# Patient Record
Sex: MALE | Race: WHITE | NOT HISPANIC OR LATINO | Employment: OTHER | URBAN - METROPOLITAN AREA
[De-identification: names, ages, dates, MRNs, and addresses within clinical notes are randomized per-mention and may not be internally consistent; named-entity substitution may affect disease eponyms.]

---

## 2018-06-11 ENCOUNTER — HOSPITAL ENCOUNTER (INPATIENT)
Facility: HOSPITAL | Age: 83
LOS: 3 days | Discharge: NURSING, REHAB OR LONG-TERM CARE | DRG: 086 | End: 2018-06-15
Attending: SURGERY | Admitting: SURGERY
Payer: MEDICARE

## 2018-06-11 ENCOUNTER — APPOINTMENT (EMERGENCY)
Dept: RADIOLOGY | Facility: HOSPITAL | Age: 83
DRG: 086 | End: 2018-06-11
Payer: MEDICARE

## 2018-06-11 DIAGNOSIS — I60.9 SAH (SUBARACHNOID HEMORRHAGE) (HCC): Primary | ICD-10-CM

## 2018-06-11 DIAGNOSIS — S02.19XA CLOSED FRACTURE OF TEMPORAL BONE, INITIAL ENCOUNTER (HCC): ICD-10-CM

## 2018-06-11 DIAGNOSIS — Z01.89 ENCOUNTER FOR GERIATRIC ASSESSMENT: ICD-10-CM

## 2018-06-11 DIAGNOSIS — S12.401A CLOSED NONDISPLACED FRACTURE OF FIFTH CERVICAL VERTEBRA, UNSPECIFIED FRACTURE MORPHOLOGY, INITIAL ENCOUNTER (HCC): ICD-10-CM

## 2018-06-11 DIAGNOSIS — I62.00 SUBDURAL HEMORRHAGE (HCC): ICD-10-CM

## 2018-06-11 DIAGNOSIS — S06.5X9A SDH (SUBDURAL HEMATOMA) (HCC): ICD-10-CM

## 2018-06-11 LAB
ANION GAP BLD CALC-SCNC: 17 MMOL/L (ref 4–13)
ANION GAP SERPL CALCULATED.3IONS-SCNC: 9 MMOL/L (ref 4–13)
APTT PPP: 27 SECONDS (ref 24–36)
BASOPHILS # BLD AUTO: 0.04 THOUSANDS/ΜL (ref 0–0.1)
BASOPHILS NFR BLD AUTO: 0 % (ref 0–1)
BUN BLD-MCNC: 23 MG/DL (ref 5–25)
BUN SERPL-MCNC: 23 MG/DL (ref 5–25)
CA-I BLD-SCNC: 1.17 MMOL/L (ref 1.12–1.32)
CALCIUM SERPL-MCNC: 9 MG/DL (ref 8.3–10.1)
CHLORIDE BLD-SCNC: 103 MMOL/L (ref 100–108)
CHLORIDE SERPL-SCNC: 105 MMOL/L (ref 100–108)
CO2 SERPL-SCNC: 28 MMOL/L (ref 21–32)
CREAT BLD-MCNC: 1.2 MG/DL (ref 0.6–1.3)
CREAT SERPL-MCNC: 1.36 MG/DL (ref 0.6–1.3)
EOSINOPHIL # BLD AUTO: 0.05 THOUSAND/ΜL (ref 0–0.61)
EOSINOPHIL NFR BLD AUTO: 0 % (ref 0–6)
ERYTHROCYTE [DISTWIDTH] IN BLOOD BY AUTOMATED COUNT: 13.9 % (ref 11.6–15.1)
GFR SERPL CREATININE-BSD FRML MDRD: 46 ML/MIN/1.73SQ M
GFR SERPL CREATININE-BSD FRML MDRD: 54 ML/MIN/1.73SQ M
GLUCOSE SERPL-MCNC: 136 MG/DL (ref 65–140)
GLUCOSE SERPL-MCNC: 143 MG/DL (ref 65–140)
HCT VFR BLD AUTO: 38.7 % (ref 36.5–49.3)
HCT VFR BLD CALC: 37 % (ref 36.5–49.3)
HGB BLD-MCNC: 12.8 G/DL (ref 12–17)
HGB BLDA-MCNC: 12.6 G/DL (ref 12–17)
IMM GRANULOCYTES # BLD AUTO: 0.05 THOUSAND/UL (ref 0–0.2)
IMM GRANULOCYTES NFR BLD AUTO: 0 % (ref 0–2)
INR PPP: 1.06 (ref 0.86–1.17)
LYMPHOCYTES # BLD AUTO: 1.51 THOUSANDS/ΜL (ref 0.6–4.47)
LYMPHOCYTES NFR BLD AUTO: 13 % (ref 14–44)
MCH RBC QN AUTO: 31.2 PG (ref 26.8–34.3)
MCHC RBC AUTO-ENTMCNC: 33.1 G/DL (ref 31.4–37.4)
MCV RBC AUTO: 94 FL (ref 82–98)
MONOCYTES # BLD AUTO: 0.56 THOUSAND/ΜL (ref 0.17–1.22)
MONOCYTES NFR BLD AUTO: 5 % (ref 4–12)
NEUTROPHILS # BLD AUTO: 9.5 THOUSANDS/ΜL (ref 1.85–7.62)
NEUTS SEG NFR BLD AUTO: 82 % (ref 43–75)
NRBC BLD AUTO-RTO: 0 /100 WBCS
PA ADP BLD-ACNC: 437 ARU
PCO2 BLD: 27 MMOL/L (ref 21–32)
PLATELET # BLD AUTO: 169 THOUSANDS/UL (ref 149–390)
PMV BLD AUTO: 10.1 FL (ref 8.9–12.7)
POTASSIUM BLD-SCNC: 3.8 MMOL/L (ref 3.5–5.3)
POTASSIUM SERPL-SCNC: 3.7 MMOL/L (ref 3.5–5.3)
PROTHROMBIN TIME: 13.9 SECONDS (ref 11.8–14.2)
RBC # BLD AUTO: 4.1 MILLION/UL (ref 3.88–5.62)
SODIUM BLD-SCNC: 142 MMOL/L (ref 136–145)
SODIUM SERPL-SCNC: 142 MMOL/L (ref 136–145)
SPECIMEN SOURCE: ABNORMAL
WBC # BLD AUTO: 11.71 THOUSAND/UL (ref 4.31–10.16)

## 2018-06-11 PROCEDURE — 86901 BLOOD TYPING SEROLOGIC RH(D): CPT | Performed by: SURGERY

## 2018-06-11 PROCEDURE — 70450 CT HEAD/BRAIN W/O DYE: CPT

## 2018-06-11 PROCEDURE — 74177 CT ABD & PELVIS W/CONTRAST: CPT

## 2018-06-11 PROCEDURE — 85730 THROMBOPLASTIN TIME PARTIAL: CPT | Performed by: SURGERY

## 2018-06-11 PROCEDURE — 90715 TDAP VACCINE 7 YRS/> IM: CPT | Performed by: STUDENT IN AN ORGANIZED HEALTH CARE EDUCATION/TRAINING PROGRAM

## 2018-06-11 PROCEDURE — 72125 CT NECK SPINE W/O DYE: CPT

## 2018-06-11 PROCEDURE — 96365 THER/PROPH/DIAG IV INF INIT: CPT

## 2018-06-11 PROCEDURE — 85025 COMPLETE CBC W/AUTO DIFF WBC: CPT | Performed by: SURGERY

## 2018-06-11 PROCEDURE — 36415 COLL VENOUS BLD VENIPUNCTURE: CPT | Performed by: STUDENT IN AN ORGANIZED HEALTH CARE EDUCATION/TRAINING PROGRAM

## 2018-06-11 PROCEDURE — 80047 BASIC METABLC PNL IONIZED CA: CPT

## 2018-06-11 PROCEDURE — 85014 HEMATOCRIT: CPT

## 2018-06-11 PROCEDURE — 86900 BLOOD TYPING SEROLOGIC ABO: CPT | Performed by: SURGERY

## 2018-06-11 PROCEDURE — 85610 PROTHROMBIN TIME: CPT | Performed by: SURGERY

## 2018-06-11 PROCEDURE — 99220 PR INITIAL OBSERVATION CARE/DAY 70 MINUTES: CPT | Performed by: SURGERY

## 2018-06-11 PROCEDURE — 85576 BLOOD PLATELET AGGREGATION: CPT | Performed by: SURGERY

## 2018-06-11 PROCEDURE — 86850 RBC ANTIBODY SCREEN: CPT | Performed by: SURGERY

## 2018-06-11 PROCEDURE — 71260 CT THORAX DX C+: CPT

## 2018-06-11 PROCEDURE — 80048 BASIC METABOLIC PNL TOTAL CA: CPT | Performed by: SURGERY

## 2018-06-11 RX ORDER — DIPHENOXYLATE HYDROCHLORIDE AND ATROPINE SULFATE 2.5; .025 MG/1; MG/1
1 TABLET ORAL DAILY
COMMUNITY

## 2018-06-11 RX ORDER — LISINOPRIL 40 MG/1
TABLET ORAL
COMMUNITY
Start: 2012-09-20

## 2018-06-11 RX ORDER — METOPROLOL SUCCINATE 100 MG/1
TABLET, EXTENDED RELEASE ORAL
COMMUNITY
Start: 2012-09-20

## 2018-06-11 RX ADMIN — DESMOPRESSIN ACETATE 28.4 MCG: 4 SOLUTION INTRAVENOUS at 23:15

## 2018-06-11 RX ADMIN — IOHEXOL 100 ML: 350 INJECTION, SOLUTION INTRAVENOUS at 23:00

## 2018-06-11 RX ADMIN — TETANUS TOXOID, REDUCED DIPHTHERIA TOXOID AND ACELLULAR PERTUSSIS VACCINE, ADSORBED 0.5 ML: 5; 2.5; 8; 8; 2.5 SUSPENSION INTRAMUSCULAR at 23:41

## 2018-06-12 ENCOUNTER — APPOINTMENT (INPATIENT)
Dept: RADIOLOGY | Facility: HOSPITAL | Age: 83
DRG: 086 | End: 2018-06-12
Payer: MEDICARE

## 2018-06-12 PROBLEM — R53.81 PHYSICAL DECONDITIONING: Status: ACTIVE | Noted: 2018-06-12

## 2018-06-12 PROBLEM — S02.19XA CLOSED FRACTURE OF TEMPORAL BONE (HCC): Status: ACTIVE | Noted: 2018-06-12
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
## 2018-06-12 PROBLEM — I62.00 SUBDURAL HEMORRHAGE (HCC): Status: ACTIVE | Noted: 2018-06-12

## 2018-06-12 PROBLEM — I60.9 SAH (SUBARACHNOID HEMORRHAGE) (HCC): Status: ACTIVE | Noted: 2018-06-12

## 2018-06-12 PROBLEM — H54.7 VISUAL IMPAIRMENT: Status: ACTIVE | Noted: 2018-06-12

## 2018-06-12 PROBLEM — S12.401A CLOSED NONDISPLACED FRACTURE OF FIFTH CERVICAL VERTEBRA (HCC): Status: ACTIVE | Noted: 2018-06-12

## 2018-06-12 PROBLEM — H91.90 HOH (HARD OF HEARING): Status: ACTIVE | Noted: 2018-06-12

## 2018-06-12 LAB
ABO GROUP BLD: NORMAL
ANION GAP SERPL CALCULATED.3IONS-SCNC: 9 MMOL/L (ref 4–13)
BASOPHILS # BLD AUTO: 0.01 THOUSANDS/ΜL (ref 0–0.1)
BASOPHILS NFR BLD AUTO: 0 % (ref 0–1)
BLD GP AB SCN SERPL QL: NEGATIVE
BUN SERPL-MCNC: 20 MG/DL (ref 5–25)
CALCIUM SERPL-MCNC: 7.8 MG/DL (ref 8.3–10.1)
CHLORIDE SERPL-SCNC: 106 MMOL/L (ref 100–108)
CO2 SERPL-SCNC: 27 MMOL/L (ref 21–32)
CREAT SERPL-MCNC: 1.19 MG/DL (ref 0.6–1.3)
EOSINOPHIL # BLD AUTO: 0 THOUSAND/ΜL (ref 0–0.61)
EOSINOPHIL NFR BLD AUTO: 0 % (ref 0–6)
ERYTHROCYTE [DISTWIDTH] IN BLOOD BY AUTOMATED COUNT: 14 % (ref 11.6–15.1)
GFR SERPL CREATININE-BSD FRML MDRD: 55 ML/MIN/1.73SQ M
GLUCOSE SERPL-MCNC: 147 MG/DL (ref 65–140)
HCT VFR BLD AUTO: 33.5 % (ref 36.5–49.3)
HGB BLD-MCNC: 10.8 G/DL (ref 12–17)
IMM GRANULOCYTES # BLD AUTO: 0.02 THOUSAND/UL (ref 0–0.2)
IMM GRANULOCYTES NFR BLD AUTO: 0 % (ref 0–2)
LYMPHOCYTES # BLD AUTO: 1.14 THOUSANDS/ΜL (ref 0.6–4.47)
LYMPHOCYTES NFR BLD AUTO: 13 % (ref 14–44)
MAGNESIUM SERPL-MCNC: 2.7 MG/DL (ref 1.6–2.6)
MCH RBC QN AUTO: 30.3 PG (ref 26.8–34.3)
MCHC RBC AUTO-ENTMCNC: 32.2 G/DL (ref 31.4–37.4)
MCV RBC AUTO: 94 FL (ref 82–98)
MONOCYTES # BLD AUTO: 0.36 THOUSAND/ΜL (ref 0.17–1.22)
MONOCYTES NFR BLD AUTO: 4 % (ref 4–12)
NEUTROPHILS # BLD AUTO: 7.14 THOUSANDS/ΜL (ref 1.85–7.62)
NEUTS SEG NFR BLD AUTO: 83 % (ref 43–75)
NRBC BLD AUTO-RTO: 0 /100 WBCS
PHOSPHATE SERPL-MCNC: 3.2 MG/DL (ref 2.3–4.1)
PLATELET # BLD AUTO: 145 THOUSANDS/UL (ref 149–390)
PMV BLD AUTO: 10.2 FL (ref 8.9–12.7)
POTASSIUM SERPL-SCNC: 4.2 MMOL/L (ref 3.5–5.3)
RBC # BLD AUTO: 3.56 MILLION/UL (ref 3.88–5.62)
RH BLD: POSITIVE
SODIUM SERPL-SCNC: 142 MMOL/L (ref 136–145)
SPECIMEN EXPIRATION DATE: NORMAL
WBC # BLD AUTO: 8.67 THOUSAND/UL (ref 4.31–10.16)

## 2018-06-12 PROCEDURE — 80048 BASIC METABOLIC PNL TOTAL CA: CPT | Performed by: STUDENT IN AN ORGANIZED HEALTH CARE EDUCATION/TRAINING PROGRAM

## 2018-06-12 PROCEDURE — 85025 COMPLETE CBC W/AUTO DIFF WBC: CPT | Performed by: STUDENT IN AN ORGANIZED HEALTH CARE EDUCATION/TRAINING PROGRAM

## 2018-06-12 PROCEDURE — 90471 IMMUNIZATION ADMIN: CPT

## 2018-06-12 PROCEDURE — 99223 1ST HOSP IP/OBS HIGH 75: CPT | Performed by: PHYSICIAN ASSISTANT

## 2018-06-12 PROCEDURE — 83735 ASSAY OF MAGNESIUM: CPT | Performed by: EMERGENCY MEDICINE

## 2018-06-12 PROCEDURE — 70480 CT ORBIT/EAR/FOSSA W/O DYE: CPT

## 2018-06-12 PROCEDURE — 99285 EMERGENCY DEPT VISIT HI MDM: CPT

## 2018-06-12 PROCEDURE — 99232 SBSQ HOSP IP/OBS MODERATE 35: CPT | Performed by: SURGERY

## 2018-06-12 PROCEDURE — 72040 X-RAY EXAM NECK SPINE 2-3 VW: CPT

## 2018-06-12 PROCEDURE — 84100 ASSAY OF PHOSPHORUS: CPT | Performed by: EMERGENCY MEDICINE

## 2018-06-12 PROCEDURE — 70450 CT HEAD/BRAIN W/O DYE: CPT

## 2018-06-12 PROCEDURE — 99232 SBSQ HOSP IP/OBS MODERATE 35: CPT | Performed by: PHYSICIAN ASSISTANT

## 2018-06-12 RX ORDER — ACETAMINOPHEN 325 MG/1
650 TABLET ORAL EVERY 6 HOURS PRN
Status: DISCONTINUED | OUTPATIENT
Start: 2018-06-12 | End: 2018-06-12

## 2018-06-12 RX ORDER — LIDOCAINE 50 MG/G
1 PATCH TOPICAL DAILY
Status: DISCONTINUED | OUTPATIENT
Start: 2018-06-12 | End: 2018-06-12

## 2018-06-12 RX ORDER — LIDOCAINE 50 MG/G
1 PATCH TOPICAL DAILY PRN
Status: DISCONTINUED | OUTPATIENT
Start: 2018-06-12 | End: 2018-06-12

## 2018-06-12 RX ORDER — LEVETIRACETAM 500 MG/1
500 TABLET ORAL EVERY 12 HOURS SCHEDULED
Status: DISCONTINUED | OUTPATIENT
Start: 2018-06-12 | End: 2018-06-15 | Stop reason: HOSPADM

## 2018-06-12 RX ORDER — SODIUM CHLORIDE, SODIUM LACTATE, POTASSIUM CHLORIDE, CALCIUM CHLORIDE 600; 310; 30; 20 MG/100ML; MG/100ML; MG/100ML; MG/100ML
125 INJECTION, SOLUTION INTRAVENOUS CONTINUOUS
Status: DISCONTINUED | OUTPATIENT
Start: 2018-06-12 | End: 2018-06-12

## 2018-06-12 RX ORDER — CHLORHEXIDINE GLUCONATE 0.12 MG/ML
15 RINSE ORAL EVERY 12 HOURS SCHEDULED
Status: DISCONTINUED | OUTPATIENT
Start: 2018-06-12 | End: 2018-06-15 | Stop reason: HOSPADM

## 2018-06-12 RX ORDER — OXYCODONE HYDROCHLORIDE 5 MG/1
5 TABLET ORAL EVERY 4 HOURS PRN
Status: DISCONTINUED | OUTPATIENT
Start: 2018-06-12 | End: 2018-06-15 | Stop reason: HOSPADM

## 2018-06-12 RX ORDER — AMOXICILLIN 250 MG
1 CAPSULE ORAL
Status: DISCONTINUED | OUTPATIENT
Start: 2018-06-12 | End: 2018-06-15 | Stop reason: HOSPADM

## 2018-06-12 RX ORDER — ACETAMINOPHEN 325 MG/1
650 TABLET ORAL EVERY 8 HOURS PRN
Status: DISCONTINUED | OUTPATIENT
Start: 2018-06-12 | End: 2018-06-15 | Stop reason: HOSPADM

## 2018-06-12 RX ORDER — ACETAMINOPHEN 325 MG/1
650 TABLET ORAL EVERY 8 HOURS SCHEDULED
Status: DISCONTINUED | OUTPATIENT
Start: 2018-06-12 | End: 2018-06-12

## 2018-06-12 RX ORDER — OXYCODONE HYDROCHLORIDE 5 MG/1
2.5 TABLET ORAL EVERY 4 HOURS PRN
Status: DISCONTINUED | OUTPATIENT
Start: 2018-06-12 | End: 2018-06-15 | Stop reason: HOSPADM

## 2018-06-12 RX ORDER — SODIUM CHLORIDE, SODIUM GLUCONATE, SODIUM ACETATE, POTASSIUM CHLORIDE, MAGNESIUM CHLORIDE, SODIUM PHOSPHATE, DIBASIC, AND POTASSIUM PHOSPHATE .53; .5; .37; .037; .03; .012; .00082 G/100ML; G/100ML; G/100ML; G/100ML; G/100ML; G/100ML; G/100ML
75 INJECTION, SOLUTION INTRAVENOUS CONTINUOUS
Status: DISCONTINUED | OUTPATIENT
Start: 2018-06-12 | End: 2018-06-12

## 2018-06-12 RX ADMIN — LEVETIRACETAM 500 MG: 500 TABLET ORAL at 22:21

## 2018-06-12 RX ADMIN — CHLORHEXIDINE GLUCONATE 15 ML: 1.2 RINSE ORAL at 22:21

## 2018-06-12 RX ADMIN — Medication 1 TABLET: at 22:21

## 2018-06-12 RX ADMIN — LEVETIRACETAM 500 MG: 100 INJECTION, SOLUTION INTRAVENOUS at 01:30

## 2018-06-12 RX ADMIN — SODIUM CHLORIDE, SODIUM GLUCONATE, SODIUM ACETATE, POTASSIUM CHLORIDE, MAGNESIUM CHLORIDE, SODIUM PHOSPHATE, DIBASIC, AND POTASSIUM PHOSPHATE 75 ML/HR: .53; .5; .37; .037; .03; .012; .00082 INJECTION, SOLUTION INTRAVENOUS at 01:13

## 2018-06-12 RX ADMIN — LEVETIRACETAM 500 MG: 500 TABLET ORAL at 09:57

## 2018-06-12 RX ADMIN — CHLORHEXIDINE GLUCONATE 15 ML: 1.2 RINSE ORAL at 09:57

## 2018-06-12 NOTE — SOCIAL WORK
CM met with pt and his son Viridiana Abarca [phone] at bedside  CM reviewed role with dcp  Pt resides at [facility] in [facility]  Pt reports being on 2nd floor with elevator access  Pt reports independent with ADLs and ambulation PTA  Pt reports he drives limited distance and his son transports him for longer trips  Pt reports no hx of VNA or STR  Pt reports hx of anxiety after his wife passed away in 2013  At that time pt was seeing a psychiatrist  No current MH tx  Pt reports no hx of drug/alcohol abuse  Pharmacy is [email or web address] for long term and [facility] for short term medication  PCP is Dr Mason Méndez  Pt reports his son - Jerzy Leavitt is his POA and has a LW  CM requested Jerzy Leavitt bring a copy  CM to follow for dcp  CM reviewed d/c planning process including the following: identifying help at home, patient preference for d/c planning needs, Discharge Lounge, [facility] Meds to Bed program, availability of treatment team to discuss questions or concerns patient and/or family may have regarding understanding medications and recognizing signs and symptoms once discharged  CM also encouraged patient to follow up with all recommended appointments after discharge  Patient advised of importance for patient and family to participate in managing patients medical well being

## 2018-06-12 NOTE — PLAN OF CARE
DISCHARGE PLANNING     Discharge to home or other facility with appropriate resources Progressing        HEMATOLOGIC - ADULT     Maintains hematologic stability Progressing        INFECTION - ADULT     Absence or prevention of progression during hospitalization Progressing     Absence of fever/infection during neutropenic period Progressing        Knowledge Deficit     Patient/family/caregiver demonstrates understanding of disease process, treatment plan, medications, and discharge instructions Progressing        METABOLIC, FLUID AND ELECTROLYTES - ADULT     Electrolytes maintained within normal limits Progressing     Fluid balance maintained Progressing     Glucose maintained within target range Progressing        NEUROSENSORY - ADULT     Achieves stable or improved neurological status Progressing     Absence of seizures Progressing     Remains free of injury related to seizures activity Progressing     Achieves maximal functionality and self care Progressing        PAIN - ADULT     Verbalizes/displays adequate comfort level or baseline comfort level Progressing        Potential for Falls     Patient will remain free of falls Progressing        SAFETY ADULT     Maintain or return to baseline ADL function Progressing     Maintain or return mobility status to optimal level Progressing        SKIN/TISSUE INTEGRITY - ADULT     Skin integrity remains intact Progressing     Incision(s), wounds(s) or drain site(s) healing without S/S of infection Progressing     Oral mucous membranes remain intact Progressing

## 2018-06-12 NOTE — CASE MANAGEMENT
Initial Clinical Review    Admission: Date/Time/Statement: 6/12/18 @ 0020     Orders Placed This Encounter   Procedures    Inpatient Admission     Standing Status:   Standing     Number of Occurrences:   1     Order Specific Question:   Admitting Physician     Answer:   Marleen Lockwood     Order Specific Question:   Level of Care     Answer:   Critical Care [15]     Order Specific Question:   Estimated length of stay     Answer:   More than 2 Midnights     Order Specific Question:   Certification     Answer:   I certify that inpatient services are medically necessary for this patient for a duration of greater than two midnights  See H&P and MD Progress Notes for additional information about the patient's course of treatment  ED: Date/Time/Mode of Arrival:   ED Arrival Information     Expected Arrival Acuity Means of Arrival Escorted By Service Admission Type    6/11/2018 21:49 6/11/2018 22:27 [facility] [place] [facility]    Arrival Complaint    fall, SAH, SDH, R temporal bone fx          Chief Complaint:   Chief Complaint   Patient presents with    Fall     Pt slipped and fell down the steps of a bus  Pt fell backwards and hit his head  No LOC  Pt on ASA  Pt sent from [facility] with a subdural, subarachnoid and right temporal fracture       History of Illness:      Tejas Cordon is a 80 y o  male who presents as a trauma transfer from [facility] following a fall earlier this evening  Patient states he was getting off the bus to go to an [employer] baseball game when he misstepped, and fell down multiple steps coming off the bus  Patient states he struck his head directly on the concrete  He did not lose consciousness and remembers the entire event  He does take aspirin 81 daily    He states he mainly has a headache, but also has some neck pain  CT scan at outside hospital demonstrated subarachnoid and subdural hemorrhages, right temporal bone fracture, and C5 fracture  ED Vital Signs:   ED Triage Vitals   Temperature Pulse Respirations Blood Pressure SpO2   06/11/18 2232 06/11/18 2232 06/11/18 2232 06/11/18 2232 06/11/18 2232   97 5 °F (36 4 °C) 63 18 169/79 91 %         Pain Score       06/12/18 0030       No Pain        Wt Readings from Last 1 Encounters:   06/12/18 92 7 kg (204 lb 5 9 oz)       Vital Signs (abnormal): Abnormal Labs/Diagnostic Test Results  Currently patient is GCS 15 with no focal neurological deficits, but has delayed response/comprehension time  He does admit to some numbness/tingling in bilateral hands, which has been present the last few months and is unchanged  CT scan at outside hospital demonstrated subarachnoid and subdural hemorrhages, right temporal bone fracture, and C5 fracture  Positive for nausea (Intermittent nausea)  Positive for neck pain  Positive for numbness (Numbness/tingling in bilateral hands, present for multiple months) and headaches  CREATININE 1 36 (H)     WBC 11 71 (H)         ED Treatment:   Medication Administration from 06/11/2018 2149 to 06/12/2018 0020       Date/Time Order Dose Route     06/11/2018 2315 desmopressin (DDAVP) 28 4 mcg in sodium chloride 0 9 % 50 mL IVPB 28 4 mcg Intravenous     06/11/2018 2341 tetanus-diphtheria-acellular pertussis (BOOSTRIX) IM injection 0 5 mL 0 5 mL Intramuscular       Past Medical/Surgical History:       No Additional Past Medical History       Admitting Diagnosis: SAH (subarachnoid hemorrhage) ([facility] ) [I60 9]  SDH (subdural hematoma) ([facility] ) [I62 00]  Closed fracture of temporal bone, initial encounter ([facility] ) [S02 19XA]  Closed nondisplaced fracture of fifth cervical vertebra, unspecified fracture morphology, initial encounter ([facility] ) [S12 401A]  Unspecified multiple injuries, initial encounter [T07  XXXA]    Age/Sex: 80 y o  male    Assessment/Plan:    Trauma Active Problems:   1) multiple foci SAH  2) small R holohemispheric SDH  3) C5 superior facet fracture  4) right temporal bone fracture     Trauma Plan:   - admit to ICU  - neurosx c/s  - continue C-collar  - keppra seizure ppx  - reverse ASA with DDAVP  - frequent neurochecks       Admission Orders:    NEURO CHECKS  Q1 HR  CERVICAL BRACE   NPO   CONSULT [address]  [facility] Closed fracture of temporal bone, initial encounter         Scheduled Meds:   Current Facility-Administered Medications:  acetaminophen 650 mg Oral Q6H PRN   chlorhexidine 15 mL Swish & Spit Q12H [facility]   levETIRAcetam 500 mg Oral Q12H [facility]   multi-electrolyte 75 mL/hr Intravenous Continuous   oxyCODONE 2 5 mg Oral Q4H PRN   oxyCODONE 5 mg Oral Q4H PRN     Continuous Infusions:   multi-electrolyte 75 mL/hr     PRN Meds:   acetaminophen    oxyCODONE    oxyCODONE

## 2018-06-12 NOTE — CONSULTS
Consultation - Neurosurgery   Kalee Beltran 80 y o  male MRN: [id number]  Unit/Bed#: ICU 01 Encounter: [id number]  Consult completed on 6/12/18 at 11:30      Inpatient consult to Neurosurgery  Consult performed by: Ilda Shah ordered by: Wily Dangelo          Assessment/Plan     Assessment:  1  Right sided holohemispheric subdural hematoma  2  Scattered subarchnoid hemorrhage in right frontal and temporal lobe, within right sylvian fisure  3  C5 right superior facet fracture  4  Closed nondisplaced right temporal bone fracture  5  Hard of hearing  6  Mechanical fall no LOC    Plan:  · Exam: GCS 15  AAOx3 MAHMOOD wo focal weakness, LT and PP intact  No midline tenderness  No PD farias or clonus  finger to nose intact  Briskly able to perform serial 7's  Imaging: personally reviewed and reviewed with attending:  · 6/11/18 - CT head wo: thin righ holohemispheric subdural hematoma, scattered subarachnoid  · 6/11/18 - CT c-spine wo: age indeterminate rigth C5 superior facet fracture   · 6/12/18 - CT head wo: pending official radiology read  Appears stable SDH/SAH  · 6/12/18 - cervical XR: pending official radiology read  Loss of lordotic curve, stable alignment compared to CT scan  Slight decrease height anterior C5 vertebral body   ICH management:  · SBP <160  · Continue Keppra 500mg BID for seizure ppx  · STAT CT head without contrast if decline in GCS >2pts/ 1hr  · DVT ppx: SCDs, hold AC/AP at this time  · History of ASA use, given DDAVP  · Continue to hold ASA for at least 2 weeks   Cervical fracture management:  · Vista collar at all times, jackie collar for showering  · PT/OT: pending evaluation  Medical management:   · Geriatric consult - appreciate recommendations  · Pain control  · Hgb 10 8, trend  Transfuse if <8    No neurosurgical intervention, signing off  Follow up in 2 weeks with repeat XR and CT scan  Call with questions or concerns         History of Present Illness   HPI: Michele Landry Lina Mason is a 80y o  year old male with PMH including CAD s/p quadruple bypass, stent placement 1-2 years ago, on ASA 81mg macular degeneration in right eye, hyperlipidemia who presented to [facility] after a mechanical fall yesterday  He was getting off the bus when he missed the step, falling down the steps and landing on the concrete  He denies LOC  He states he was assisted into a chair then EMS was called  A CT scan of his head demonstrated multiple contusions vs subarachnoid hemorrhage and right sided SDH  His CT scan of his c-spine demonstrated age indeterminate right C5 superior facet fracture  He was transferred to [facility] for further evaluation  Patient states he lives in an independent living facility, he still drives short distances to get a hair cut or grocery shop otherwise he relies on his son or th bus  He denies multiple falls stating his last one was a year ago, another mechanical fall  He denies prior head or neck trauma  He admits to having some dizziness being transferred to CT scan with associated nausea but since has resolved  Review of Systems   Constitutional: Positive for fatigue  Negative for activity change  HENT: Negative for tinnitus and trouble swallowing  Eyes: Negative for photophobia and visual disturbance  Respiratory: Negative for shortness of breath  Cardiovascular: Negative for chest pain  Gastrointestinal: Positive for nausea  Negative for abdominal pain, constipation, diarrhea and vomiting  Genitourinary: Negative for dysuria, frequency and urgency  Musculoskeletal: Negative for back pain and neck pain  Skin: Positive for wound  Negative for rash  Allergic/Immunologic: Negative for environmental allergies and food allergies  Neurological: Positive for dizziness  Negative for syncope, weakness, numbness and headaches  Hematological: Does not bruise/bleed easily  Psychiatric/Behavioral: Negative for confusion   The patient is nervous/anxious (history of anxiety, well controlled on medications)  Historical Information   Past Medical History:   Diagnosis Date    Anxiety     CAD (coronary artery disease)     Cataract     left s/p removal    Hyperlipidemia     Macular degeneration     right     Past Surgical History:   Procedure Laterality Date    CATARACT EXTRACTION Left     CORONARY ANGIOPLASTY WITH STENT PLACEMENT      CORONARY ARTERY BYPASS GRAFT      TONSILECTOMY AND ADNOIDECTOMY       History   Alcohol Use    Yes     Comment: rare      History   Drug Use No     History   Smoking Status    Never Smoker   Smokeless Tobacco    Never Used     Family History   Problem Relation Age of Onset    Breast cancer Mother     Brain cancer Father        Meds/Allergies   all current active meds have been reviewed and current meds:   Current Facility-Administered Medications   Medication Dose Route Frequency    acetaminophen (TYLENOL) tablet 650 mg  650 mg Oral Q8H PRN    chlorhexidine (PERIDEX) 0 12 % oral rinse 15 mL  15 mL Swish & Spit Q12H [address]    levETIRAcetam (KEPPRA) tablet 500 mg  500 mg Oral Q12H [address]    lidocaine (LIDODERM) 5 % patch 1 patch  1 patch Transdermal Daily PRN    oxyCODONE (ROXICODONE) IR tablet 2 5 mg  2 5 mg Oral Q4H PRN    oxyCODONE (ROXICODONE) IR tablet 5 mg  5 mg Oral Q4H PRN    senna-docusate sodium (SENOKOT S) 8 6-50 mg per tablet 1 tablet  1 tablet Oral HS     Allergies   Allergen Reactions    Ezetimibe Rash       Objective   I/O       06/10 0701 - 06/11 0700 06/11 0701 - 06/12 0700 06/12 0701 - 06/13 0700    I V  (mL/kg)  248 8 (2 7) 300 (3 2)    IV Piggyback  150     Total Intake(mL/kg)  398 8 (4 3) 300 (3 2)    Urine (mL/kg/hr)  500 250 (0 5)    Stool  0     Total Output   500 250    Net   -101 3 +50           Unmeasured Stool Occurrence  1 x           Physical Exam   Constitutional: He is oriented to person, place, and time  He appears well-developed and well-nourished     HENT:   Head: Normocephalic and atraumatic  Superficial left occipital abrasion    Eyes: EOM are normal  Pupils are unequal    Cardiovascular: Normal rate  Pulmonary/Chest: Effort normal  No respiratory distress  Abdominal: Soft  There is no tenderness  There is no rebound  Musculoskeletal:        Cervical back: He exhibits no bony tenderness  Thoracic back: He exhibits no bony tenderness  Lumbar back: He exhibits no bony tenderness  Neurological: He is alert and oriented to person, place, and time  He has normal strength  He has a normal Finger-Nose-Finger Test    Reflex Scores:       Bicep reflexes are 2+ on the right side and 2+ on the left side  Brachioradialis reflexes are 2+ on the right side and 2+ on the left side  Patellar reflexes are 2+ on the right side and 2+ on the left side  Achilles reflexes are 2+ on the right side and 2+ on the left side  Skin: Skin is warm  Psychiatric: His speech is normal and behavior is normal  Thought content normal      Neurologic Exam     Mental Status   Oriented to person, place, and time  Registration: recalls 3 of 3 objects  Recall at 5 minutes: recalls 3 of 3 objects  Follows 2 step commands  Attention: normal  Concentration: normal    Speech: speech is normal   Level of consciousness: alert  Knowledge: good  Able to perform simple calculations  Able to name object  Able to repeat  Normal comprehension  Cranial Nerves     CN II   Visual fields full to confrontation  CN III, IV, VI   Extraocular motions are normal    Pupils: unequal  Right pupil: Right pupil size in mm: 3 5mm  Shape: regular  Reactivity: brisk  Left pupil: Size: 3 mm  Shape: regular  Reactivity: brisk  CN III: no CN III palsy  CN VI: no CN VI palsy  Nystagmus: none   Diplopia: none  Ophthalmoparesis: none  Upgaze: normal  Downgaze: normal  Conjugate gaze: present    CN V   Facial sensation intact  CN VII   Facial expression full, symmetric       CN VIII   Hearing: impaired    CN XII   Tongue deviation: none    Motor Exam   Muscle bulk: normal  Overall muscle tone: normal  Right arm pronator drift: absent  Left arm pronator drift: absent    Strength   Strength 5/5 throughout  Sensory Exam   Light touch normal    Proprioception normal    Pinprick normal      Gait, Coordination, and Reflexes     Coordination   Finger to nose coordination: normal    Tremor   Resting tremor: absent    Reflexes   Right brachioradialis: 2+  Left brachioradialis: 2+  Right biceps: 2+  Left biceps: 2+  Right patellar: 2+  Left patellar: 2+  Right achilles: 2+  Left achilles: 2+  Right Liu: absent  Left Liu: absent  Right ankle clonus: absent  Left ankle clonus: absent      Vitals:Blood pressure (!) 117/49, pulse 62, temperature 99 3 °F (37 4 °C), temperature source Oral, resp  rate 16, height 5' 10" (1 778 m), weight 92 7 kg (204 lb 5 9 oz), SpO2 93 %  ,Body mass index is 29 32 kg/m²       Lab Results:     Results from last 7 days  Lab Units 06/12/18 0437 06/11/18 2242 06/11/18  2241   WBC Thousand/uL 8 67 11 71*  --    HEMOGLOBIN g/dL 10 8* 12 8  --    I STAT HEMOGLOBIN g/dl  --   --  12 6   HEMATOCRIT % 33 5* 38 7  --    PLATELETS Thousands/uL 145* 169  --    NEUTROS PCT % 83* 82*  --    MONOS PCT % 4 5  --        Results from last 7 days  Lab Units 06/12/18 0437 06/11/18 2242 06/11/18  2241   SODIUM mmol/L 142 142  --    POTASSIUM mmol/L 4 2 3 7  --    CHLORIDE mmol/L 106 105  --    CO2 mmol/L 27 28  --    BUN mg/dL 20 23  --    CREATININE mg/dL 1 19 1 36*  --    CALCIUM mg/dL 7 8* 9 0  --    GLUCOSE RANDOM mg/dL 147* 136  --    GLUCOSE, ISTAT mg/dl  --   --  143*       Results from last 7 days  Lab Units 06/12/18  0437   MAGNESIUM mg/dL 2 7*       Results from last 7 days  Lab Units 06/12/18  0437   PHOSPHORUS mg/dL 3 2       Results from last 7 days  Lab Units 06/11/18  2242   INR  1 06   PTT seconds 27     No results found for: TROPONINT  ABG:No results found for: PHART, LOP7YXU, PO2ART, MOT9STH, R2KHWVFY, BEART, SOURCE    Imaging Studies: I have personally reviewed pertinent reports  and I have personally reviewed pertinent films in PACS   Ct Head Wo Contrast    Result Date: 6/12/2018  Narrative: CT BRAIN - WITHOUT CONTRAST INDICATION:   SDH  COMPARISON:  6/11/2018  TECHNIQUE:  CT examination of the brain was performed  In addition to axial images, coronal 2D reformatted images were created and submitted for interpretation  Radiation dose length product (DLP) for this visit:  1067 23 mGy-cm   This examination, like all CT scans performed in the [facility], was performed utilizing techniques to minimize radiation dose exposure, including the use of iterative reconstruction and automated exposure control  IMAGE QUALITY:  Diagnostic  FINDINGS: PARENCHYMA:  Right hemispheric subdural hemorrhage again identified  Subdural hemorrhage is not significantly increased in size and measures only approximately 4 mm from the inner table of the calvarium  Slight interval enlargement of the parenchymal hemorrhage within the right anterolateral temporal lobe now measuring 2 cm in oblique AP diameter  Subarachnoid hemorrhage is seen extending into a right posterior lateral frontal sulcus on series 2 image 33, decreasing in size compared to the prior examination  Small amount of subarachnoid and subdural hemorrhages seen along the posterior falx cerebri and right posterior tentorium  This may represent redistribution of the subarachnoid hemorrhage described above  Mild diffuse cerebral volume loss  Moderate diffuse periventricular white matter hypoattenuation within the cerebral hemispheres suggestive of chronic microangiopathy  Stable brainstem and cerebellum  Stable vasculature  VENTRICLES:  No ventriculomegaly  No intraventricular hemorrhage  VISUALIZED ORBITS AND PARANASAL SINUSES:  Unremarkable orbits   CALVARIUM AND EXTRACRANIAL SOFT TISSUES:  Partial opacification of the right mastoid air cells  See separate CT temporal bones  Impression: Stable, thin, holohemisperic subdural hemorrhage within the right hemisphere without subfalcine or transtentorial herniation  There is mild interval enlargement of the hemorrhagic contusion within the right anterolateral temporal lobe  Slight decrease in the size of the focal subarachnoid hemorrhage within the right posterior lateral frontal lobe, some of which may represent redistribution of blood more posteriorly along the posterior falx and tentorium  Stable atrophy and chronic microangiopathic change  Partial opacification of the right mastoid temporal bone  See separate CT of the temporal bones report  Workstation performed: [id number]     Ct Head Without Contrast    Result Date: 6/11/2018  Narrative: CT BRAIN - WITHOUT CONTRAST INDICATION:   fall  COMPARISON:  None  TECHNIQUE:  CT examination of the brain was performed  In addition to axial images, coronal 2D reformatted images were created and submitted for interpretation  Radiation dose length product (DLP) for this visit:  1102 59 mGy-cm   This examination, like all CT scans performed in the [facility], was performed utilizing techniques to minimize radiation dose exposure, including the use of iterative reconstruction and automated exposure control  IMAGE QUALITY:  Diagnostic  FINDINGS: PARENCHYMA:  There is a thin right-sided holohemispheric subdural hematoma measuring up to 9 mm in thickness  There are scattered areas of subarachnoid hemorrhage for example there is an area of subarachnoid hemorrhage measuring 1 8 x 2 3 cm along the superior right frontal lobe (series 2, image 35)  In addition, subarachnoid hemorrhage 1 5 cm is seen within the right temporal lobe (series 2, image 24)  A small amount of subarachnoid hemorrhage is seen with layering within the right sylvian fissure     There are low attenuation changes in the periventricular white matter which are likely due to chronic small vessel ischemic changes  There is no significant midline shift  VENTRICLES AND EXTRA-AXIAL SPACES:  Normal for the patient's age  VISUALIZED ORBITS AND PARANASAL SINUSES:  Partial opacification of the right mastoid air cells and fluid within the middle ear cavity  There is a longitudinal temporal bone fracture (series 400, image 55)  CALVARIUM AND EXTRACRANIAL SOFT TISSUES:  Left posterior scalp soft tissue swelling  Impression: 1  Thin right-sided holohemispheric subdural hematoma without significant midline shift  2   Scattered areas of subarachnoid hemorrhage as described above  3   Partial opacification of the right mastoid air cells and fluid within the right middle ear cavity with associated longitudinal right temporal bone fracture  I personally discussed this study with CHELI FORD on 6/11/2018 at 11:30 PM  Workstation performed: [id number]     Ct Spine Cervical Without Contrast    Result Date: 6/11/2018  Narrative: CT CERVICAL SPINE - WITHOUT CONTRAST INDICATION:   Fall  COMPARISON: None  TECHNIQUE:  CT examination of the cervical spine was performed without intravenous contrast   Contiguous axial images were obtained  Sagittal and coronal reconstructions were performed  Radiation dose length product (DLP) for this visit:  527 mGy-cm   This examination, like all CT scans performed in the [facility], was performed utilizing techniques to minimize radiation dose exposure, including the use of iterative reconstruction and automated exposure control  IMAGE QUALITY:  Diagnostic  FINDINGS: ALIGNMENT:  Grade 1 anterolisthesis of C3 on C4  Grade 1 retrolisthesis of C5 on C6  VERTEBRAL BODIES:  There is a linear lucency through the right superior facet of the C5 vertebral body (series 602, image 57)  DEGENERATIVE CHANGES:  Moderate multilevel cervical degenerative changes are noted  No critical central canal stenosis    Multilevel facet arthropathy including fusion of the C2-C3 facet on the right  PREVERTEBRAL AND PARASPINAL SOFT TISSUES:  Unremarkable  THORACIC INLET:  Normal      Impression: Linear lucency through the right superior facet of the C5 vertebral body which may represent age-indeterminate fracture  Workstation performed: [id number]     Trauma - Ct Chest Abdomen Pelvis W Contrast    Result Date: 6/11/2018  Narrative: CT CHEST, ABDOMEN AND PELVIS WITH IV CONTRAST INDICATION:   trauma  COMPARISON: None  TECHNIQUE: CT examination of the chest, abdomen and pelvis was performed  Axial, sagittal, and coronal 2D reformatted images were created from the source data and submitted for interpretation  Radiation dose length product (DLP) for this visit:  0496 78 75 49 mGy-cm   This examination, like all CT scans performed in the [facility], was performed utilizing techniques to minimize radiation dose exposure, including the use of iterative reconstruction and automated exposure control  IV Contrast:  100 mL of iohexol (OMNIPAQUE)   was administered intravenously without immediate adverse reaction  Enteric Contrast: Enteric contrast was not administered  FINDINGS: CHEST LUNGS:  Mild bibasilar atelectasis/scarring  The central airways are patent  PLEURA:  No pneumothorax or pleural effusion  HEART/GREAT VESSELS:  Status post CABG  The heart is normal in size  No pericardial effusion  The thoracic aorta is normal in course and caliber  MEDIASTINUM AND YENIFER:  Unremarkable  CHEST WALL AND LOWER NECK:   Unremarkable  ABDOMEN LIVER/BILIARY TREE:  Unremarkable  GALLBLADDER:  No calcified gallstones  No pericholecystic inflammatory change  SPLEEN:  Unremarkable  PANCREAS:  Severe fatty atrophy of the pancreas  There are areas of sparing within the body and tail of the pancreas  ADRENAL GLANDS:  Unremarkable  KIDNEYS/URETERS:  One or more sharply circumscribed subcentimeter renal hypodensities are noted   These lesions are too small to accurately characterize, but are statistically most likely to represent benign cortical renal cyst(s)  According to the guidelines published in the [facility] Paper of the ACR Incidental Findings Committee (Radiology 2010), no further workup of these lesions is recommended  STOMACH AND BOWEL:  Unremarkable  APPENDIX:  No findings to suggest appendicitis  ABDOMINOPELVIC CAVITY:  No ascites or free intraperitoneal air  No lymphadenopathy  VESSELS:  Mild atherosclerotic calcifications  PELVIS REPRODUCTIVE ORGANS:  The prostate is enlarged  URINARY BLADDER:  Unremarkable  ABDOMINAL WALL/INGUINAL REGIONS:  Unremarkable  OSSEOUS STRUCTURES:  No acute fracture or destructive osseous lesion  Degenerative changes of the osseous structures  Grade 1 retrolisthesis of L2 on L3 with disc space narrowing and associated endplate changes  Chronic mild wedge compression deformity of the T9 vertebral body  Impression: No acute traumatic injury identified within the chest, abdomen or pelvis  Workstation performed: [id number]       EKG, Pathology, and Other Studies: I have personally reviewed pertinent reports  and I have personally reviewed pertinent films in PACS    VTE Prophylaxis: Sequential compression device (Venodyne)     Code Status: Level 1 - Full Code  Advance Directive and Living Will:      Power of :    POLST:      Counseling / Coordination of Care  I spent 45 minutes with the patient

## 2018-06-12 NOTE — H&P
H&P Exam - Trauma   Theo Blancas 80 y o  male MRN: [id number]  Unit/Bed#: ED 05 Encounter: [id number]    Assessment/Plan   Trauma Alert: Evaluation  Model of Arrival: Transfer Cherry  Trauma Team: Attending Starr Albright and Residents Karina  Consultants: Neurosurgery: Dr Murry Breath  Time Called 2335    Trauma Active Problems:   1) multiple foci SAH  2) small R holohemispheric SDH  3) C5 superior facet fracture  4) right temporal bone fracture    Trauma Plan:   - admit to ICU  - neurosx c/s  - continue C-collar  - keppra seizure ppx  - reverse ASA with DDAVP  - frequent neurochecks        Chief Complaint: headache    History of Present Illness   HPI:  Theo Blancas is a 80 y o  male who presents as a trauma transfer from [facility] following a fall earlier this evening  Patient states he was getting off the bus to go to an [employer] baseball game when he misstepped, and fell down multiple steps coming off the bus  Patient states he struck his head directly on the concrete  He did not lose consciousness and remembers the entire event  He does take aspirin 81 daily, but denies being on any other blood thinners  He states he mainly has a headache, but also has some neck pain  CT scan at outside hospital demonstrated subarachnoid and subdural hemorrhages, right temporal bone fracture, and C5 fracture  Currently patient is GCS 15 with no focal neurological deficits, but has delayed response/comprehension time  He does admit to some numbness/tingling in bilateral hands, which has been present the last few months and is unchanged  Mechanism:Fall    Review of Systems   Constitutional: Negative for chills and fever  HENT: Negative  Negative for tinnitus  Eyes: Negative  Negative for visual disturbance  Respiratory: Negative  Negative for cough and shortness of breath  Cardiovascular: Negative  Negative for chest pain and palpitations     Gastrointestinal: Positive for nausea (Intermittent nausea)  Negative for abdominal pain and vomiting  Endocrine: Negative  Genitourinary: Negative  Musculoskeletal: Positive for neck pain  Skin: Negative  Allergic/Immunologic: Negative  Neurological: Positive for numbness (Numbness/tingling in bilateral hands, present for multiple months) and headaches  Negative for dizziness, weakness and light-headedness  Hematological: Negative  Psychiatric/Behavioral: Negative  Historical Information     History reviewed  No pertinent past medical history  History reviewed  No pertinent surgical history  Social History   History   Alcohol Use No     History   Drug Use No     History   Smoking Status    Never Smoker   Smokeless Tobacco    Never Used     There is no immunization history for the selected administration types on file for this patient  Last Tetanus:  Unknown, will administer now  Family History: Non-contributory      Meds/Allergies   all current active meds have been reviewed    Allergies   Allergen Reactions    Ezetimibe Rash         PHYSICAL EXAM      Objective   Vitals:   First set: Temperature: 97 5 °F (36 4 °C) (06/11/18 2232)  Pulse: 63 (06/11/18 2232)  Respirations: 18 (06/11/18 2232)  Blood Pressure: 169/79 (06/11/18 2232)    Primary Survey:   (A) Airway:  Intact  (B) Breathing:  Present bilaterally  (C) Circulation: Pulses:   carotid  2/4, pedal  dopplerable, radial  2/4 and femoral  2/4  (D) Disabliity:  GCS Total:  15  (E) Expose:  Completed    Secondary Survey: (Click on Physical Exam tab above)  Physical Exam   Constitutional: He is oriented to person, place, and time  He appears well-developed and well-nourished  No distress  HENT:   Hematoma to R parietal scalp     Eyes: EOM are normal  Pupils are equal, round, and reactive to light  No scleral icterus     3mm and reactive to light b/l   Neck: No JVD present    +midline cervical spine tenderness   Cardiovascular: Normal rate, regular rhythm and normal heart sounds  Median sternotomy scar well healed   Pulmonary/Chest: Effort normal and breath sounds normal  No stridor  No respiratory distress  Abdominal: Soft  He exhibits no distension  There is no tenderness  There is no rebound and no guarding  Musculoskeletal: He exhibits no edema, tenderness or deformity  Abrasion to L shin and L knee   Neurological: He is alert and oriented to person, place, and time  No cranial nerve deficit  Alert  Oriented to person, place, and time  Able to recall items after 2 minutes  Slowed response/comprehension time  ? Hard of hearing   Skin: Skin is warm and dry  He is not diaphoretic  Psychiatric: He has a normal mood and affect         Invasive Devices     Peripheral Intravenous Line            Peripheral IV 06/11/18 Left Antecubital less than 1 day    Peripheral IV 06/11/18 Left Forearm less than 1 day    Peripheral IV 06/11/18 Right Antecubital less than 1 day                Lab Results:   BMP/CMP:   Lab Results   Component Value Date     06/11/2018    K 3 7 06/11/2018     06/11/2018    CO2 28 06/11/2018    ANIONGAP 9 06/11/2018    BUN 23 06/11/2018    CREATININE 1 36 (H) 06/11/2018    GLUCOSE 136 06/11/2018    GLUCOSE 143 (H) 06/11/2018    CALCIUM 9 0 06/11/2018    EGFR 46 06/11/2018    EGFR 54 06/11/2018   , CBC:   Lab Results   Component Value Date    WBC 11 71 (H) 06/11/2018    HGB 12 8 06/11/2018    HCT 38 7 06/11/2018    MCV 94 06/11/2018     06/11/2018    MCH 31 2 06/11/2018    MCHC 33 1 06/11/2018    RDW 13 9 06/11/2018    MPV 10 1 06/11/2018    NRBC 0 06/11/2018    and Coagulation:   Lab Results   Component Value Date    INR 1 06 06/11/2018     Imaging/EKG Studies: CT Scan Head: Subdural and subarachnoid hemorrhage, CT Scan C-Spine: C5 fracture, CT Chest: Negative, CT Scan Abdomen/Pelvis: Negative  Other Studies:  None    Code Status: No Order  Advance Directive and Living Will:      Power of :    POLST:

## 2018-06-12 NOTE — PROGRESS NOTES
Progress Note - ICU Transfer to SD/MS tele   Juancho Mccollum 80 y o  male MRN: [id number]  [address]   Unit/Bed#: ICU 01 Encounter: [id number]    Code Status: Level 1 - Full Code  POA:    POLST:      Reason for ICU admission: SAH/SDH on ASA    Active problems:   Principal Problem:    Subdural hemorrhage ([address] )  Active Problems:    SAH (subarachnoid hemorrhage) ([address] )    Closed nondisplaced fracture of fifth cervical vertebra (HCC)    Closed fracture of temporal bone (HCC)    [place] (hard of hearing)    Fall (on) (from) other stairs and steps, initial encounter    Physical deconditioning    Visual impairment  Resolved Problems:    * No resolved hospital problems  *      Consultants:   Neurosurgery  PT/OT    History of Present Illness: s/p fall down the stairs while exiting the bus with resultant SDH,SAH, temporal bone fracture and C5 facet fracture  Takes daily baby ASA and was recommended for DDAVP for which he was transferred from [facility] to [facility]  Summary of clinical course:   Neuro exam remains stable, GCS 15  Neurosurgery evaluated and recommending no surgical intervention, repeat CT scan today for stability  Pt received DDAVP upon arrival and ASA on hold  Recommended for [employer] collar and upright XRays for C5 fracture  Will need ENT evaluation for temporal bone fracture as well as PT and OT evaluations prior to discharge  He is clinically stable for transfer to medical surgical level of care at this time       Outstanding Diagnostics:  CT Head this afternoon  Temporal bone recons this afternoon  Upright cervical XRay in brace    Cultures: NA       Mobilization Plan: Up with assist - needs PT and OT evals     Nutrition Plan: regular diet    Discharge Plan:   Patient should be ready for discharge after ENT, PT/OT evals and repeat head CT's/upright XRays per neurosurgery, anticipate likely DC tomorrow    Initial Physical Therapy Recommendations: not completed yet  Initial Occupational Therapy Recommendations: not completed yet  Initial /Plan: following    Spoke with Clarissa Mims  regarding transfer  Please call [phone] with any questions or concerns  Portions of the record may have been created with voice recognition software  Occasional wrong word or "sound a like" substitutions may have occurred due to the inherent limitations of voice recognition software  Read the chart carefully and recognize, using context, where substitutions have occurred      NEGRITO Meeks

## 2018-06-12 NOTE — TERTIARY TRAUMA SURVEY
Progress Note - Tertiary Trauma Survery   Toney Lay 80 y o  male MRN: [id number]  Unit/Bed#: ICU 01 Encounter: [id number]    Summary of Diagnosed Injuries:    1  Traumatic, multifocal SAH  2  Traumatic right holohemispheric subdural hematoma  3  Acute C5 superior facet fracture  4  Right temporal bone fracture    Clinical Plan:     Neuro:  GCS is 15  The patient has anisocoria, but otherwise a nonfocal neurologic exam   Currently pain-free, except for mild headache, for which the patient has deferred analgesia  -Tylenol as needed for pain              -neurosurgical consultation              -will recheck head CT scan if the patient has changes in mental status              -critical neuro checks              -Keppra for seizure prophylaxis              -continue cervical collar, and logroll precautions      CV:  No acute issues  Continue telemetry               -goal SBP <140   -continue to hold aspirin     Pulm:  Hypoxia - unknown etiology  The patient is currently on 3 L nasal cannula, and at home does not have an O2 requirement  He has no history of chronic tobacco use, and likely does not have a history of COPD  Possibly positional, as the patient is immobilized in a cervical collar at this time  CT scan did demonstrate bibasilar atelectasis, which may also be contributing to his hypoxia              -continue supplemental oxygen via N/C, with goal oxygen saturation >92%              -incentive spirometer, pulmonary toilet              -if he continues to be hypoxic, will consider HS CPAP  Will also consider further cardiac evaluation at that time      GI:  Regular house diet     :  Creatinine 1 36  Unknown chronicity  Will monitor creatinine and urine output     F/E/N:               -Will d/c fluid if tolerate diet              -will replete electrolytes as needed     ID:  No acute issues  No signs of infection  Currently with mild leukocytosis    Will monitor fever curve and leukocyte count      Heme:  H/H stable  Will monitor closely  Continue to hold DVT prophylaxis, and anti-platelet agents     Endo:  No acute issues      Msk/Skin:  Continue frequent turning, routine skin care  Dispo: Transfer to [facility] surgery    Mechanism of Injury: Fall    Transfer from: [facility]  Outside Films Received: no  Tertiary Exam Due on: 6/12/18    Vitals: Blood pressure 112/58, pulse 56, temperature 98 4 °F (36 9 °C), temperature source Oral, resp  rate 20, height 5' 10" (1 778 m), weight 92 7 kg (204 lb 5 9 oz), SpO2 94 %  ,Body mass index is 29 32 kg/m²  CT / RADIOGRAPHS: ALL RESULTS MUST BE CONFIRMED BY FACULTY OR PRINTED REPORT    CT HEAD 6/12/18: 1  Thin right-sided holohemispheric subdural hematoma without significant midline shift  2   Scattered areas of subarachnoid hemorrhage as described above  3   Partial opacification of the right mastoid air cells and fluid within the right middle ear cavity with associated longitudinal right temporal bone fracture  CT CHEST:    CT FACE:  CT ABDOMEN / PELVIS 6/11/18:No acute traumatic injury identified within the chest, abdomen or pelvis  CT CERVICAL SPINE 6/11/18:  Linear lucency through the right superior facet of the C5 vertebral body which may represent age-indeterminate fracture   XR PELVIS:    CT THORACIC / LUMBAR SPINE:  CXR CHEST:    OTHER: OTHER:    OTHER:  OTHER:    OTHER: OTHER:    OTHER:  OTHER:    OTHER:  OTHER:      Consultants - List Service/ Faculty and Date: Neurosurgery 6/12/18    Active medications:           Current Facility-Administered Medications:     acetaminophen (TYLENOL) tablet 650 mg, 650 mg, Oral, Q6H PRN    chlorhexidine (PERIDEX) 0 12 % oral rinse 15 mL, 15 mL, Swish & Spit, Q12H [facility], 15 mL at 06/12/18 0957    levETIRAcetam (KEPPRA) tablet 500 mg, 500 mg, Oral, Q12H [facility], 500 mg at 06/12/18 0957    multi-electrolyte (ISOLYTE-S PH 7 4 equivalent) IV solution, 75 mL/hr, Intravenous, Continuous, 75 mL/hr at 06/12/18 0113    oxyCODONE (ROXICODONE) IR tablet 2 5 mg, 2 5 mg, Oral, Q4H PRN    oxyCODONE (ROXICODONE) IR tablet 5 mg, 5 mg, Oral, Q4H PRN      Intake/Output Summary (Last 24 hours) at 06/12/18 1213  Last data filed at 06/12/18 0800   Gross per 24 hour   Intake           698 75 ml   Output              750 ml   Net           -51 25 ml       Invasive Devices     Peripheral Intravenous Line            Peripheral IV 06/11/18 Left Antecubital 1 day    Peripheral IV 06/11/18 Left Forearm less than 1 day    Peripheral IV 06/11/18 Right Antecubital less than 1 day                CAGE-AID Questionnaire:    Was the patient able to participate in the CAGE-AID screening questions on admission? Yes    Is the patient 65 years or older: YES:    1  Before the illness or injury that brought you to the Emergency, did you need someone to help you on a regular basis? 0=No   2  Since the illness or injury that brought you to the Emergency, have you needed more help than usual to take care of yourself? 1=Yes   3  Have you been hospitalized for one or more nights during the past 6 months (excluding a stay in the Emergency Department)? 0=No   4  In general, do you see well? 0=Yes   5  In general, do you have serious problems with your memory? 0=No   6  Do you take more than three different medications everyday? 1=Yes   TOTAL   2     Did you order a geriatric consult if the score was 2 or greater?: yes    1  GCS:  GCS Total:  15  2  Head:   a  Inspect and palpate SCALP for:  lac/abrasion:  None  3  Neck:   WNL and cervical brace  4  Chest:   WNL  5  Abdomen/Pelvis:   WNL  6  Back (log roll with spinal immobilization unless cleared radiographically): WNL  7  Extremities:   Lacs, abrasions, swelling, ecchymosis: Abrasion to left knee/shin   Tenderness, pain with motor, instability: none  8  Peripheral Nerves:   WNL    Labs:   CBC:   Lab Results   Component Value Date    WBC 8 67 06/12/2018    HGB 10 8 (L) 06/12/2018    HCT 33 5 (L) 06/12/2018    MCV 94 06/12/2018     (L) 06/12/2018    MCH 30 3 06/12/2018    MCHC 32 2 06/12/2018    RDW 14 0 06/12/2018    MPV 10 2 06/12/2018    NRBC 0 06/12/2018     CMP:   Lab Results   Component Value Date     06/12/2018     06/12/2018    CO2 27 06/12/2018    ANIONGAP 9 06/12/2018    BUN 20 06/12/2018    CREATININE 1 19 06/12/2018    GLUCOSE 147 (H) 06/12/2018    GLUCOSE 143 (H) 06/11/2018    CALCIUM 7 8 (L) 06/12/2018    EGFR 55 06/12/2018    EGFR 54 06/11/2018     Phosphorus:   Lab Results   Component Value Date    PHOS 3 2 06/12/2018     Magnesium:   Lab Results   Component Value Date    MG 2 7 (H) 06/12/2018     Coagulation:   Lab Results   Component Value Date    INR 1 06 06/11/2018     Troponin: No results found for: TROPONINI  ABG: No results found for: PHART, HLY8FIG, PO2ART, UOT1PFF, O3EDWRJI, BEART, SOURCE

## 2018-06-12 NOTE — ORTHOTIC NOTE
Orthotic Note            Date: 6/12/2018      Patient Name: Arnoldo Guevara        Time: 10:00am    Reason for Consult:  Patient Active Problem List   Diagnosis    [facility] [address] [address]    SAH (subarachnoid hemorrhage) ([facility] )    Closed nondisplaced fracture of fifth cervical vertebra ([facility] )    Closed fracture of temporal bone ([facility] )   [place]/Daniela Collar [id number]    I measured, fit, and donned [employer] while patient was in reclining chair at bedside  Pt tolerated well and instructions/adjustments reviewed at this time while family present  I molded bilateral sides and adjusted anterior chin plate to level 2 for optimal fit  Instructions, vista replacement pads, my contact information, and Daniela shower collar at bedside  RN aware  I will continue daily follow up with patient  Recommendations:  Please call Mobility Coordinator at ext  [phone] in regards to bracing instruction and/or adjustment  Vinita Broussard Mobility Coordinator LCFo, LCOF, ASOP R  O T, O B T

## 2018-06-12 NOTE — CONSULTS
Consultation - [place] Mantle 80 y o  male MRN: [id number]  Unit/Bed#: [facility] 932-01 Encounter: [id number]        Assessment:  Right otitic capsule sparing temporal bone fracture with intact facial nerve function throughout  Plan:  Discussed needs for outpatient audiogram  Continue inpatient care per primary team      History of Present Illness   Physician Requesting Consult: Ruiz Lopez MD  Reason for Consult / Principal Problem:  Head trauma  HPI: Amari Mitchell is a 80y o  year old male who presents with fall from few steps high due to mechanical reasons  He had no loss of consciousness  Fall occurred approximately 2 days ago as he was getting off of a bus  He landed on the back of his head and was brought to [facility] for evaluation  CT scan demonstrated a right temporal bone fracture  Follow-up imaging of the temporal bone showed a right otic capsule sparing temporal bone fracture  Patient notes some right-sided decrease in hearing which he feels is mild  No facial weakness  He did have some previous hearing loss and had discussed with his family and friends about getting hearing aids  He does note some tinnitus which existed prior to his fall  No ear drainage  No salty taste in the back of his mouth  No previous head trauma  No previous ear surgery  Review of systems:  10 Point ROS was performed and negative except as above or otherwise noted in the medical record      Historical Information   Past Medical History:   Diagnosis Date    Anxiety     CAD (coronary artery disease)     Cataract     left s/p removal    Hyperlipidemia     Macular degeneration     right     Past Surgical History:   Procedure Laterality Date    CATARACT EXTRACTION Left     CORONARY ANGIOPLASTY WITH STENT PLACEMENT      CORONARY ARTERY BYPASS GRAFT      TONSILECTOMY AND ADNOIDECTOMY       Social History   History   Alcohol Use    Yes     Comment: rare      History   Drug Use No History   Smoking Status    Never Smoker   Smokeless Tobacco    Never Used     Family History:   Family History   Problem Relation Age of Onset   [de-identified] Breast cancer Mother     Brain cancer Father        Meds/Allergies   all current active meds have been reviewed, current meds:   Current Facility-Administered Medications   Medication Dose Route Frequency    acetaminophen (TYLENOL) tablet 650 mg  650 mg Oral Q8H PRN    chlorhexidine (PERIDEX) 0 12 % oral rinse 15 mL  15 mL Swish & Spit Q12H [facility]    levETIRAcetam (KEPPRA) tablet 500 mg  500 mg Oral Q12H [facility]    oxyCODONE (ROXICODONE) IR tablet 2 5 mg  2 5 mg Oral Q4H PRN    oxyCODONE (ROXICODONE) IR tablet 5 mg  5 mg Oral Q4H PRN    senna-docusate sodium (SENOKOT S) 8 6-50 mg per tablet 1 tablet  1 tablet Oral HS    and PTA meds:   Prior to Admission Medications   Prescriptions Last Dose Informant Patient Reported? Taking? BuPROPion HCl (WELLBUTRIN PO) 6/11/2018 at 0800  Yes Yes   Sig: Take by mouth   Furosemide (LASIX PO) 6/11/2018 at 0800  Yes Yes   Sig: Take by mouth   aspirin 81 MG tablet 6/11/2018 at 0800  Yes Yes   Sig: Take by mouth   lisinopril (ZESTRIL) 40 mg tablet 6/11/2018 at 0800  Yes Yes   Sig: Take by mouth   metoprolol succinate (TOPROL XL) 100 mg 24 hr tablet 6/11/2018 at 0800  Yes Yes   Sig: Take by mouth   multivitamin (THERAGRAN) TABS 6/11/2018 at 0800  Yes Yes   Sig: Take 1 tablet by mouth daily      Facility-Administered Medications: None       Allergies   Allergen Reactions    Ezetimibe Rash       Objective     Vitals:    06/12/18 1755   BP:    Pulse:    Resp:    Temp:    SpO2: 93%         Physical Exam   Constitutional: Oriented to person, place, and time  Well-developed and well-nourished, no apparent distress, non-toxic appearance  Cooperative, able to hear and answer questions without difficulty  Voice: Normal voice quality  Head: Normocephalic  Small abrasion and laceration the posterior occiput    Healing well at this time   Face: Symmetric, no edema, no sinus tenderness  Eyes: Vision grossly intact, extra-ocular movement intact  Ears: External ears normal   No post-auricular erythema or tenderness  No Gomez sign  There is some scant blood in his right external auditory canal  Unable to completely visualize tympanic membrane  No active bleeding  No CSF leakage evident  Nose: Septum intact, nares clear  Mucosa moist, turbinates well appearing  No crusting, polyps or discharge evident  Oral cavity: Dentition intact  Mucosa moist, lips without lesions or masses  Tongue mobile, floor of mouth soft and flat  Hard palate intact  No masses or lesions  Oropharynx: Uvula is midline, soft palate intact without lesion or mass  Oropharyngeal inlet without obstruction  Tonsils unremarkable  Posterior pharyngeal wall clear  No masses or lesions  Salivary glands:  Parotid glands and submandibular glands symmetric, no enlargement or tenderness  Neck: Normal laryngeal elevation with swallow  Trachea midline  No masses or lesions  No palpable adenopathy  Thyroid: Without tenderness or palpable nodules  Pulmonary/Chest: Normal effort and rate  No respiratory distress  No stertor or stridor  Musculoskeletal: Normal range of motion  Neurological: Cranial nerves 2-12 intact  Skin: Skin is warm and dry  Psychiatric: Normal mood and affect  Intake/Output Summary (Last 24 hours) at 06/12/18 1855  Last data filed at 06/12/18 1601   Gross per 24 hour   Intake          1673 75 ml   Output             1250 ml   Net           423 75 ml       Invasive Devices     Peripheral Intravenous Line            Peripheral IV 06/11/18 Left Antecubital 1 day    Peripheral IV 06/11/18 Left Forearm less than 1 day    Peripheral IV 06/11/18 Right Antecubital less than 1 day                Lab Results: I have personally reviewed pertinent lab results  Imaging Studies: I have personally reviewed pertinent reports     and I have personally reviewed pertinent films in PACS  EKG, Pathology, and Other Studies: I have personally reviewed pertinent reports  Code Status: Level 1 - Full Code  Advance Directive and Living Will:      Power of :    POLST:      Counseling/Coordination of Care: Total floor / unit time spent today 50 minutes  Greater than 50% of total time was spent with the patient and / or family counseling and / or coordination of care  A description of the counseling / coordination of care: We discussed the ongoing management of his temporal bone fracture  We discussed extensively the use of hearing aids to address any hearing loss in long-term  We discussed the need for hearing aids to prevent injury and the likelihood that it will help in prevention of ongoing cognitive loss  Approximately 50 minutes was required for this discussion as well as the coordination of his care

## 2018-06-12 NOTE — H&P
History and Physical - Critical Care   Aurora Loza 80 y o  male MRN: [id number]  Unit/Bed#: ICU 01 Encounter: [id number]    Reason for Admission / Chief Complaint:  S/P fall, traumatic SAH, SDH, C5 facet fracture    History of Present Illness:  Aurora Loza is a 80 y o  male with history of CAD status post CABG, hyperlipidemia, hypertension, who presents as a transfer from [facility] after a mechanical fall when the patient tripped down the steps coming off the bus, striking his head on the concrete  He is not anticoagulated, but does take 81 mg of aspirin daily  At [facility], a CT scan was obtained which did demonstrate extensive subarachnoid hemorrhages, as well as a small holohemispheric SDH, as well as a C5 superior facet fracture, and a right-sided temporal bone fracture  He was transferred to [facility] for Trauma admission and neurosurgery consultation  Currently, the patient is complaining of a mild headache  He has no other complaints  He specifically denies nausea, vomiting, blurred vision, visual changes, chest pain, shortness of breath, neck pain, numbness or tingling of the extremities  History obtained from chart review and the patient  Past Medical History:  History reviewed  No pertinent past medical history  Past Surgical History:  History reviewed  No pertinent surgical history  Past Family History:  History reviewed  No pertinent family history  Social History:  History   Smoking Status    Never Smoker   Smokeless Tobacco    Never Used     History   Alcohol Use No     History   Drug Use No     Marital Status:    Exercise History:     Medications:  Current Facility-Administered Medications   Medication Dose Route Frequency    acetaminophen (TYLENOL) tablet 650 mg  650 mg Oral Q6H PRN    chlorhexidine (PERIDEX) 0 12 % oral rinse 15 mL  15 mL Swish & Spit Q12H [address]    levETIRAcetam (KEPPRA) 500 mg in sodium chloride 0 9 % 100 mL IVPB  500 mg Intravenous Q12H [facility]    multi-electrolyte (ISOLYTE-S PH 7 4 equivalent) IV solution  75 mL/hr Intravenous Continuous    oxyCODONE (ROXICODONE) IR tablet 2 5 mg  2 5 mg Oral Q4H PRN    oxyCODONE (ROXICODONE) IR tablet 5 mg  5 mg Oral Q4H PRN     Home medications:  Prior to Admission medications    Medication Sig Start Date End Date Taking? Authorizing Provider   aspirin 81 MG tablet Take by mouth 10/30/06  Yes Historical Provider, MD   BuPROPion HCl (WELLBUTRIN PO) Take by mouth   Yes Historical Provider, MD   Furosemide (LASIX PO) Take by mouth   Yes Historical Provider, MD   lisinopril (ZESTRIL) 40 mg tablet Take by mouth 9/20/12  Yes Historical Provider, MD   metoprolol succinate (TOPROL XL) 100 mg 24 hr tablet Take by mouth 9/20/12  Yes Historical Provider, MD   multivitamin (THERAGRAN) TABS Take 1 tablet by mouth daily   Yes Historical Provider, MD     Allergies: Allergies   Allergen Reactions    Ezetimibe Rash       ROS:   Review of Systems   Constitutional: Negative for chills and fever  HENT: Negative for congestion and rhinorrhea  Eyes: Negative for photophobia and visual disturbance  Respiratory: Negative for cough and shortness of breath  Cardiovascular: Negative for chest pain and palpitations  Gastrointestinal: Negative for abdominal pain, diarrhea, nausea and vomiting  Genitourinary: Negative for dysuria and frequency  Musculoskeletal: Negative for neck pain and neck stiffness  Skin: Negative for pallor and rash  Neurological: Positive for headaches  Negative for light-headedness  All other systems reviewed and are negative        Vitals:  Vitals:    06/12/18 0000 06/12/18 0013 06/12/18 0030 06/12/18 0100   BP: 132/69 137/68 163/75 137/65   BP Location:   Left arm Left arm   Pulse: 70 68 72 56   Resp: 18 18 17 17   Temp:   98 °F (36 7 °C)    TempSrc:   Oral    SpO2: 95% 95% 97% 95%   Weight:   92 7 kg (204 lb 5 9 oz)    Height:   5' 10" (1 778 m)      Temperature:   Temp (24hrs), Av 8 °F (36 6 °C), Min:97 5 °F (36 4 °C), Max:98 °F (36 7 °C)    Current Temperature: 98 °F (36 7 °C)    Weights:   IBW: 73 kg  Body mass index is 29 32 kg/m²  Hemodynamic Monitoring:  N/A     Non-Invasive/Invasive Ventilation Settings:  Respiratory    Lab Data (Last 4 hours)    None         O2/Vent Data (Last 4 hours)    None              No results found for: PHART, QWP2NGG, PO2ART, CIZ7KXP, Y7XJBMWJ, BEART, SOURCE  SpO2: SpO2: 95 %     Physical Exam:  Physical Exam   Constitutional: He is oriented to person, place, and time  Awake, alert, no acute distress  Nontoxic in appearance  HENT:   Head: Normocephalic  Right Ear: External ear normal    Left Ear: External ear normal    Mouth/Throat: No oropharyngeal exudate  Eyes: No scleral icterus  Right pupil is 4 mm, left pupil is 3 mm  Both reactive to light and accommodation  Extraocular motions are intact  Neck: Normal range of motion  No JVD present  Cardiovascular: Normal rate and normal heart sounds  No murmur heard  Pulmonary/Chest: Effort normal  No respiratory distress  He has no wheezes  He has no rales  Abdominal: Soft  He exhibits no distension  There is no tenderness  Musculoskeletal: Normal range of motion  He exhibits no edema  Neurological: He is alert and oriented to person, place, and time  Strength is 5/5 in all extremities bilaterally  Sensation grossly intact and equal in all extremities  No dysmetria with finger-to-nose testing         Labs:    Results from last 7 days  Lab Units 18  2241   WBC Thousand/uL 11 71*  --    HEMOGLOBIN g/dL 12 8  --    I STAT HEMOGLOBIN g/dl  --  12 6   HEMATOCRIT % 38 7  --    PLATELETS Thousands/uL 169  --    NEUTROS PCT % 82*  --    MONOS PCT % 5  --       Results from last 7 days  Lab Units 18  2241   SODIUM mmol/L 142  --    POTASSIUM mmol/L 3 7  --    CHLORIDE mmol/L 105  --    CO2 mmol/L 28  --    BUN mg/dL 23  --    CREATININE mg/dL 1 36*  --    CALCIUM mg/dL 9 0  --    GLUCOSE RANDOM mg/dL 136  --    GLUCOSE, ISTAT mg/dl  --  143*                Results from last 7 days  Lab Units 06/11/18  2242   INR  1 06   PTT seconds 27         No results found for: TROPONINI    Imaging:  I have personally reviewed pertinent reports  EKG: This was personally reviewed by myself  Micro:  No results found for: [place]    ______________________________________________________________________    Assessment: This is a 80-year-old male who presents status post mechanical fall, suffering traumatic subdural and subarachnoid hemorrhages  Currently with anisocoria, but otherwise unremarkable neurologic exam     Active problems  1  Traumatic, multifocal SAH  2  Traumatic right holohemispheric subdural hematoma  3  Acute C5 superior facet fracture  4  Right temporal bone fracture  5  Platelet dysfunction secondary to chronic aspirin use  6  History of coronary artery disease  7  Hypoxia requiring supplemental oxygen via N/C  8  Elevated creatinine    Plan:     Neuro:  GCS is 15  The patient has anisocoria, but otherwise a nonfocal neurologic exam   Currently pain-free, except for mild headache, for which the patient has deferred analgesia  -Tylenol as needed for pain   -neurosurgical consultation   -will recheck head CT scan if the patient has changes in mental status   -critical neuro checks   -Keppra for seizure prophylaxis   -continue cervical collar, and logroll precautions  CV:  No acute issues  Continue telemetry    -goal SBP <140    -well at home antihypertensives in a m  after the patient passes bedside swallow eval   In the meantime, will give p r n  for blood pressure control, and consider Cardene drip if his blood pressures become labile   -continue to hold aspirin    Pulm:  Hypoxia - unknown etiology  The patient is currently on 3 L nasal cannula, and at home does not have an O2 requirement  He has no history of chronic tobacco use, and likely does not have a history of COPD  Possibly positional, as the patient is immobilized in a cervical collar at this time  CT scan did demonstrate bibasilar atelectasis, which may also be contributing to his hypoxia   -continue supplemental oxygen via N/C, with goal oxygen saturation >92%   -incentive spirometer, pulmonary toilet   -if he continues to be hypoxic, will consider HS CPAP  Will also consider further cardiac evaluation at that time  GI:  Currently NPO  Pending bedside swallow eval     :  Creatinine 1 36  Unknown chronicity  Will monitor creatinine and urine output    F/E/N:    -lactated Ringer's at 75/hour   -will replete electrolytes as needed   -currently NPO pending bedside swallow eval     ID:  No acute issues  No signs of infection  Currently with mild leukocytosis  Will monitor fever curve and leukocyte count  Heme:  H/H stable  Will monitor closely  Continue to hold DVT prophylaxis, and anti-platelet agents    Endo:  No acute issues  Msk/Skin:  Continue frequent turning, routine skin care  Disposition:  ICU level care  Patient is full code    Counseling / Coordination of Care      ______________________________________________________________________    VTE Pharmacologic Prophylaxis: Sequential compression device (Venodyne)   VTE Mechanical Prophylaxis: sequential compression device    Invasive lines and devices: Invasive Devices     Peripheral Intravenous Line            Peripheral IV 06/11/18 Left Antecubital 1 day    Peripheral IV 06/11/18 Left Forearm less than 1 day    Peripheral IV 06/11/18 Right Antecubital less than 1 day                Code Status: Level 1 - Full Code  POA:    POLST:      Given critical illness, patient length of stay will require greater than two midnights  Portions of the record may have been created with voice recognition software    Occasional wrong word or "sound a like" substitutions may have occurred due to the inherent limitations of voice recognition software  Read the chart carefully and recognize, using context, where substitutions have occurred        Clifton Berrios MD

## 2018-06-12 NOTE — DISCHARGE INSTRUCTIONS
Neurosurgery Discharge Instructions:    VISTA collar at all times except for showering change to jackie collar  No heavy lifting  No strenuous activities  NO DRIVING  Do not take any blood thinning medications (ie  No Advil  No motrin  No ibuprofen  No Aleve  No Aspirin  No fishoil  No heparin  No antiplatelet / no anticoagulation medication)  Refrain from activity that increases chance of trauma to head or falls  Recommend you take fall precaution  No strenuous activity or sports  Follow up with Neurosurgery in 2 weeks  2-3 days prior to your appointment complete an x-ray of your neck and a CT scan of head at any [address] [place] [address]  The scripts for the imaging is now electronic  X-rays do not require an appointment but you must schedule a time for your CT scan  To scheduled your CT appointment call [phone]    **Please notify MD immediately if you have worsening/new headaches, nausea/vomiting, speech or vision changes, seizures, confusion, increased neck or arm pain  New numbness and/or weakness in your arm  Difficulty swallowing or breathing especially while lying down  Numbness or weakness in arms or legs   **

## 2018-06-12 NOTE — TRAUMA DOCUMENTATION
Pt was transferred from [facility] with a subdural hematoma, subarachnoid hematoma and right temporal fracture

## 2018-06-12 NOTE — CONSULTS
Consultation - Geriatrics   Gina Jama 80 y o  male MRN: [id number]  Unit/Bed#: ICU 01 Encounter: [id number]      Assessment/Plan  1  No signs of cognitive impairment  Patient scores appropriately on mini cog assessment, 4-5, converses appropriately about current events  Patient encouraged to keep his mind active to prevent any further decline  Patient may follow up at [facility] for positive aging upon discharge for any concerns over his memory    2  Hard of hearing  Spoke with patient about having audiology referral as outpatient for hearing aids, believe patient would benefit from hearing aids  Speak loudly clearly well directly facing the patient    3  Fall  Fall precautions  Patient reports other falls that were mild  Home meds unremarkable  PT, OT once appropriate  Recommend adding vitamin D3 1000 international units daily patient's medication regimen    4  Deconditioning  Secondary to injuries, hospital stay  Mobilize frequently to prevent further decline  PT, OT  Patient may benefit from rehab versus home therapies    5  Visual impairment  Per history, macular degeneration  Keep room well lit 1 appropriate, Ensure patient wearing glasses at all appropriate times    6  Anxiety  Bupropion being held, lowers seizure threshold  Recommend restarting when appropriate    7  SAH, SDH, C5 superior facet fracture  Neurosurgery consult, recommendations pending    8  Delirium precautions  Will start Tylenol 650 mg Q 8  Will add Lidoderm patch  Agree with low-dose oxycodone  Will add Senokot S daily for bowel regimen  Recommend restarting Wellbutrin once appropriate, does look lower seizure threshold, would defer to Neurosurgery  Redirect unwanted patient behaviors as first line tx  Reorient patient frequently    Avoid deliriogenic meds including tramadol, benzodiazepines, benadryl  Good sleep hygiene important, limit night time interruptions  Encourage patient to stay awake during the day  Ensure adequate hydration/nutrition  Mobilize often           History of Present Illness   Physician Requesting Consult: Steven Pastor MD  Reason for Consult / Principal Problem: isar  Hx and PE limited by: n/a  HPI: Debbie Reyes is a 80y o  year old male with hx of visual impairment, anxiety, who presents as transfer from [facility] following a fall  Patient was getting off the bus to attend an [email or web address] game when he misstepped and fell down  Patient found to have multiple foci SAH, small SDH, C5 superior facet fracture, right temporal bone fracture, patient was admitted to the ICU, Neurosurgery was consulted  Prior to arrival patient lived at [address]  Patient was independent with ADLs and ambulation, patient does still drive limited distance, son provides transportation for longer trips  Patient's history of anxiety for she takes Wellbutrin  Patient is pleasant  Reports he enjoys fixing clocks as a hobby  He reports no issues with his memory, and scores 4/5 on mini cog assessment which is normal   He reports he is pleased with his independent living at which he resides, is happy with the people to assist common dining goff  He speaks about current events appropriately  Inpatient consult to Gerontology  Consult performed by: Manju Rosen ordered by: DEMETRIA, [address] : Positive for hearing loss  Eyes: Negative for visual disturbance  Respiratory: Negative for chest tightness and shortness of breath  Cardiovascular: Negative for chest pain and palpitations  Gastrointestinal: Negative for abdominal distention, abdominal pain, constipation, diarrhea, nausea and vomiting  Genitourinary: Negative for difficulty urinating  Musculoskeletal: Negative for gait problem and myalgias  Neurological: Positive for light-headedness  Psychiatric/Behavioral: Negative for confusion  The patient is nervous/anxious  All other systems reviewed and are negative  Historical Information   Past Medical History:   Diagnosis Date    Anxiety     CAD (coronary artery disease)     Cataract     left s/p removal    Hyperlipidemia     Macular degeneration     right     Past Surgical History:   Procedure Laterality Date    CATARACT EXTRACTION Left     CORONARY ANGIOPLASTY WITH STENT PLACEMENT      CORONARY ARTERY BYPASS GRAFT      TONSILECTOMY AND ADNOIDECTOMY       Social History   History   Alcohol Use    Yes     Comment: rare      History   Drug Use No     History   Smoking Status    Never Smoker   Smokeless Tobacco    Never Used         Family History: non-contributory    Meds/Allergies   Current meds:   Current Facility-Administered Medications   Medication Dose Route Frequency    acetaminophen (TYLENOL) tablet 650 mg  650 mg Oral Q6H PRN    chlorhexidine (PERIDEX) 0 12 % oral rinse 15 mL  15 mL Swish & Spit Q12H [address]    levETIRAcetam (KEPPRA) tablet 500 mg  500 mg Oral Q12H [address]    multi-electrolyte (ISOLYTE-S PH 7 4 equivalent) IV solution  75 mL/hr Intravenous Continuous    oxyCODONE (ROXICODONE) IR tablet 2 5 mg  2 5 mg Oral Q4H PRN    oxyCODONE (ROXICODONE) IR tablet 5 mg  5 mg Oral Q4H PRN      Current PTA meds:  Prescriptions Prior to Admission   Medication    aspirin 81 MG tablet    BuPROPion HCl (WELLBUTRIN PO)    Furosemide (LASIX PO)    lisinopril (ZESTRIL) 40 mg tablet    metoprolol succinate (TOPROL XL) 100 mg 24 hr tablet    multivitamin (THERAGRAN) TABS        Allergies   Allergen Reactions    Ezetimibe Rash       Objective   Vitals: Blood pressure 112/58, pulse 56, temperature 98 4 °F (36 9 °C), temperature source Oral, resp  rate 20, height 5' 10" (1 778 m), weight 92 7 kg (204 lb 5 9 oz), SpO2 94 %  ,Body mass index is 29 32 kg/m²  Physical Exam   Constitutional: He is oriented to person, place, and time  He appears well-developed  No distress     HENT:   Head: Normocephalic and atraumatic  Mouth/Throat: No oropharyngeal exudate  Eyes: Conjunctivae and EOM are normal  No scleral icterus  Neck: Neck supple  Cardiovascular: Normal rate  Pulmonary/Chest: Effort normal and breath sounds normal  He has no wheezes  He has no rales  Abdominal: Soft  Bowel sounds are normal  He exhibits no distension  Musculoskeletal: He exhibits no edema  Neurological: He is alert and oriented to person, place, and time  Skin: Skin is warm and dry  Psychiatric: He has a normal mood and affect  His behavior is normal  Judgment and thought content normal  He is not agitated and not actively hallucinating  Cognition and memory are not impaired  He exhibits normal recent memory and normal remote memory  Patient is alert oriented x4  Scores 4/5 on mini cog assessment which is within normal limits  Patient admits to anxiety, for which she takes bupropion  No signs delirium  Nursing note and vitals reviewed  Lab Results:   Results from last 7 days  Lab Units 06/12/18  0437   WBC Thousand/uL 8 67   HEMOGLOBIN g/dL 10 8*   HEMATOCRIT % 33 5*   PLATELETS Thousands/uL 145*        Results from last 7 days  Lab Units 06/12/18  0437   SODIUM mmol/L 142   POTASSIUM mmol/L 4 2   CHLORIDE mmol/L 106   CO2 mmol/L 27   BUN mg/dL 20   CREATININE mg/dL 1 19   CALCIUM mg/dL 7 8*   GLUCOSE RANDOM mg/dL 147*       Imaging Studies: I have personally reviewed pertinent reports  EKG, Pathology, and Other Studies: I have personally reviewed pertinent reports  VTE Prophylaxis: Sequential compression device (Venodyne)     Code Status: Level 1 - Full Code      Counseling/Coordination of Care: Total floor / unit time spent today 25 minutes  Greater than 50% of total time was spent with the patient and / or family counseling and / or coordination of care   A description of the counseling / coordination of care: Assessing examine the patient, reviewing EMR meds, speaking to nursing staff, assessing cognition, speaking to patient about anxiety and depression

## 2018-06-13 PROBLEM — H91.90 HOH (HARD OF HEARING): Status: RESOLVED | Noted: 2018-06-12 | Resolved: 2018-06-13

## 2018-06-13 PROBLEM — N17.9 ACUTE KIDNEY INJURY (HCC): Status: ACTIVE | Noted: 2018-06-13

## 2018-06-13 PROCEDURE — 94660 CPAP INITIATION&MGMT: CPT

## 2018-06-13 PROCEDURE — 94760 N-INVAS EAR/PLS OXIMETRY 1: CPT

## 2018-06-13 PROCEDURE — 99232 SBSQ HOSP IP/OBS MODERATE 35: CPT | Performed by: SURGERY

## 2018-06-13 PROCEDURE — 99232 SBSQ HOSP IP/OBS MODERATE 35: CPT | Performed by: FAMILY MEDICINE

## 2018-06-13 RX ADMIN — LEVETIRACETAM 500 MG: 500 TABLET ORAL at 09:05

## 2018-06-13 RX ADMIN — CHLORHEXIDINE GLUCONATE 15 ML: 1.2 RINSE ORAL at 09:05

## 2018-06-13 RX ADMIN — Medication 1 TABLET: at 21:23

## 2018-06-13 RX ADMIN — LEVETIRACETAM 500 MG: 500 TABLET ORAL at 21:22

## 2018-06-13 NOTE — PROGRESS NOTES
Progress Note - Emirda Creed 10/27/1930, 80 y o  male MRN: [id number]    Unit/Bed#: [facility] 932-01 Encounter: [id number]    Primary Care Provider: Harshil Colunga   Date and time admitted to hospital: 6/11/2018 10:27 PM    Physical deconditioning   Assessment & Plan    Determine placement PT/OT        Closed fracture of temporal bone [facility])   Assessment & Plan    ENT saw patient needs outpatient audiogram        Closed nondisplaced fracture of fifth cervical vertebra [facility])   Assessment & Plan    Cervical collar at all times        SAH (subarachnoid hemorrhage) ([facility] [address] )   Assessment & Plan    Stable on repeat head CT  Continue Keppra  PT/OT  Hold all chemical anticoagulation  SCDs        * Subdural hemorrhage ([facility])   Assessment & Plan    Stable on repeat CT  Continue Keppra  GCS drops repeat head CT  These PT/OT  Currently is GCS 14-15          Progress Note - Trauma   Mario Alberto Creed 80 y o  male MRN: [id number]  Unit/Bed#: [facility] 932-01 Encounter: [id number]    Assessment:  Patient with subdural/subarachnoid has normal neuro exam that is unchanged  Repeat image in stable  Need to determine disposition  Patient offers no complaints  Plan:   PT/OT  Geriatrics consult  Continue to monitor neuro exam    Chief Complaint: none offered was sleeping    Subjective:  States he has been doing well  Denies numbness/tingling/weakness that is new  States he chronically has tingling in his upper extremities which is unchanged  Denies headache denies any changes in vision      Objective:  See below    Meds/Allergies   Prescriptions Prior to Admission   Medication Sig Dispense Refill Last Dose    aspirin 81 MG tablet Take by mouth   6/11/2018 at 0800    BuPROPion HCl (WELLBUTRIN PO) Take by mouth   6/11/2018 at 0800    Furosemide (LASIX PO) Take by mouth   6/11/2018 at 0800    lisinopril (ZESTRIL) 40 mg tablet Take by mouth   6/11/2018 at 0800    metoprolol succinate (TOPROL XL) 100 mg 24 hr tablet Take by mouth 6/11/2018 at 0800    multivitamin [facility]) TABS Take 1 tablet by mouth daily   6/11/2018 at 0800       Vitals: Blood pressure 128/59, pulse 66, temperature 98 6 °F (37 °C), temperature source Oral, resp  rate 16, height 5' 10" (1 778 m), weight 92 7 kg (204 lb 5 9 oz), SpO2 94 %  Body mass index is 29 32 kg/m²  SpO2: SpO2: 94 %    ABG: No results found for: PHART, ODB5HXS, PO2ART, NRU9BNC, R9ZNLHUS, BEART, SOURCE      Intake/Output Summary (Last 24 hours) at 06/13/18 1516  Last data filed at 06/13/18 0594   Gross per 24 hour   Intake              680 ml   Output              450 ml   Net              230 ml       Invasive Devices     Peripheral Intravenous Line            Peripheral IV 06/11/18 Left Antecubital 2 days    Peripheral IV 06/11/18 Left Forearm 1 day    Peripheral IV 06/11/18 Right Antecubital 1 day                Nutrition/GI Proph/Bowel Reg:  Regular house diet; Senokot    Physical Exam:   Patient is pleasant and interactive and is GCS of 15  HEENT:  Trachea midline/pupils reactive and symmetrical  CV:  Normal heart rate overnight  LUNGS:  No increased work of breathing  ABD:  Abdomen is soft and nondistended x4  EXT:  For extremity pulses equal moves extremities x4  NEURO:  GCS 15/no focal findings/cranial nerves 2-12 intact  SKIN:  Moist and/no rash      Lab Results:   No new labs    Imaging/EKG Studies: repeat head CT:  Stable, thin, holohemisperic subdural hemorrhage within the right hemisphere without subfalcine or transtentorial herniation      There is mild interval enlargement of the hemorrhagic contusion within the right anterolateral temporal lobe  Slight decrease in the size of the focal subarachnoid hemorrhage within the right posterior lateral frontal lobe, some of which may represent   redistribution of blood more posteriorly along the posterior falx and tentorium      Stable atrophy and chronic microangiopathic change        Partial opacification of the right mastoid temporal bone  See separate CT of the temporal bones report  Xray Cervical:  Slight reversal of the normal cervical lordosis with anterior subluxation of C4 upon C5 measuring 4 mm      VTE Prophylaxis: Hold chemic DVT ppx and hold antiplatelets

## 2018-06-13 NOTE — PROGRESS NOTES
Progress Note - Ammy Stevenson 80 y o  male MRN: [id number]    Unit/Bed#: [facility] [place] 932-01 Encounter: [id number]      Assessment/Plan  1  No signs of cognitive impairment  Patient continues to be A*Ox4  Patient scores appropriately on mini cog assessment, 4-5, converses appropriately about current events  Patient encouraged to keep his mind active to prevent any further decline  Patient may follow up at [facility] for positive aging upon discharge for any concerns over his memory     2  Hard of hearing  Continue supportive care  Spoke with patient about having audiology referral as outpatient for hearing aids, believe patient would benefit from hearing aids  Speak loudly clearly well directly facing the patient     3  Fall  Fall precautions  Patient reports other falls that were mild  Home meds unremarkable  PT, OT once appropriate, still pending  Recommend adding vitamin D3 1000 international units daily patient's medication regimen     4  Deconditioning  Secondary to injuries, hospital stay  Mobilize frequently to prevent further decline  PT, OT  Patient may benefit from rehab versus home therapies     5  Visual impairment  Per history, macular degeneration  Keep room well lit when appropriate, Ensure patient wearing glasses at all appropriate times     6  Anxiety  Bupropion being held, lowers seizure threshold  Recommend restarting when appropriate-- continues to be held     7  SAH, SDH, C5 superior facet fracture  Neurosurgery consult, recommendations pending     8  Delirium precautions  Consider adding Tylenol 650 mg Q 8  Consider adding Lidoderm patch  Agree with low-dose oxycodone  Continue with Senokot S daily for bowel regimen-- patient reports BM this AM  Recommend restarting Wellbutrin once appropriate, does look lower seizure threshold, would defer to Neurosurgery  Redirect unwanted patient behaviors as first line tx  Reorient patient frequently    Avoid deliriogenic meds including tramadol, benzodiazepines, benadryl  Good sleep hygiene important, limit night time interruptions  Encourage patient to stay awake during the day  Ensure adequate hydration/nutrition  Mobilize often               Subjective:   Patient alert and oriented x4  Reports still feeling lightheaded, reports dull pain in head but would not diagnose as headache  Patient denies fatigue,  vision changes, chest pain, sob, N/V/D, constipation, difficulty with urination, muscle pain  Objective:     Vitals: Blood pressure 128/59, pulse 66, temperature 98 6 °F (37 °C), temperature source Oral, resp  rate 16, height 5' 10" (1 778 m), weight 92 7 kg (204 lb 5 9 oz), SpO2 94 %  ,Body mass index is 29 32 kg/m²  Intake/Output Summary (Last 24 hours) at 06/13/18 1249  Last data filed at 06/13/18 0538   Gross per 24 hour   Intake             1455 ml   Output              700 ml   Net              755 ml       Physical Exam: General : WD in NAD, in c collar  HEENT : MMM no erythema or exudates  EOMI, sclera anicteric  Heart : Normal rate  Lungs : CTA  Abdomen : Soft, NT/ND, BS auscultated in all 4 quads  No organomegaly  Ext :  Pulses +2/4 B/L  Neg edema B/L  Neg calf swelling B/L  Skin : Pink, warm, dry, age appropriate turgor and mobility  Neuro : Nonfocal  Psych : A * O x 4       Invasive Devices     Peripheral Intravenous Line            Peripheral IV 06/11/18 Left Antecubital 2 days    Peripheral IV 06/11/18 Left Forearm 1 day    Peripheral IV 06/11/18 Right Antecubital 1 day                Lab, Imaging and other studies: I have personally reviewed pertinent reports      VTE Pharmacologic Prophylaxis: Sequential compression device (Venodyne)   VTE Mechanical Prophylaxis: sequential compression device

## 2018-06-13 NOTE — ASSESSMENT & PLAN NOTE
Stable on repeat CT    Continue Keppra  GCS drops repeat head CT  These PT/OT  Currently is GCS 14-15

## 2018-06-13 NOTE — PLAN OF CARE
Problem: Potential for Falls  Goal: Patient will remain free of falls  INTERVENTIONS:  - Assess patient frequently for physical needs  -  Identify cognitive and physical deficits and behaviors that affect risk of falls    -  [place] fall precautions as indicated by assessment   - Educate patient/family on patient safety including physical limitations  - Instruct patient to call for assistance with activity based on assessment  - Modify environment to reduce risk of injury  - Consider OT/PT consult to assist with strengthening/mobility   Outcome: Progressing      Problem: PAIN - ADULT  Goal: Verbalizes/displays adequate comfort level or baseline comfort level  Interventions:  - Encourage patient to monitor pain and request assistance  - Assess pain using appropriate pain scale  - Administer analgesics based on type and severity of pain and evaluate response  - Implement non-pharmacological measures as appropriate and evaluate response  - Consider cultural and social influences on pain and pain management  - Notify physician/advanced practitioner if interventions unsuccessful or patient reports new pain   Outcome: Progressing      Problem: INFECTION - ADULT  Goal: Absence or prevention of progression during hospitalization  INTERVENTIONS:  - Assess and monitor for signs and symptoms of infection  - Monitor lab/diagnostic results  - Monitor all insertion sites, i e  indwelling lines, tubes, and drains  - Monitor endotracheal (as able) and nasal secretions for changes in amount and color  - [place]ina appropriate cooling/warming therapies per order  - Administer medications as ordered  - Instruct and encourage patient and family to use good hand hygiene technique  - Identify and instruct in appropriate isolation precautions for identified infection/condition   Outcome: Progressing    Goal: Absence of fever/infection during neutropenic period  INTERVENTIONS:  - Monitor WBC  - Implement neutropenic guidelines   Outcome: Progressing      Problem: SAFETY ADULT  Goal: Maintain or return to baseline ADL function  INTERVENTIONS:  -  Assess patient's ability to carry out ADLs; assess patient's baseline for ADL function and identify physical deficits which impact ability to perform ADLs (bathing, care of mouth/teeth, toileting, grooming, dressing, etc )  - Assess/evaluate cause of self-care deficits   - Assess range of motion  - Assess patient's mobility; develop plan if impaired  - Assess patient's need for assistive devices and provide as appropriate  - Encourage maximum independence but intervene and supervise when necessary  ¯ Involve family in performance of ADLs  ¯ Assess for home care needs following discharge   ¯ Request OT consult to assist with ADL evaluation and planning for discharge  ¯ Provide patient education as appropriate   Outcome: Progressing

## 2018-06-14 LAB
ANION GAP SERPL CALCULATED.3IONS-SCNC: 6 MMOL/L (ref 4–13)
BUN SERPL-MCNC: 17 MG/DL (ref 5–25)
CALCIUM SERPL-MCNC: 8.2 MG/DL (ref 8.3–10.1)
CHLORIDE SERPL-SCNC: 105 MMOL/L (ref 100–108)
CO2 SERPL-SCNC: 26 MMOL/L (ref 21–32)
CREAT SERPL-MCNC: 0.96 MG/DL (ref 0.6–1.3)
GFR SERPL CREATININE-BSD FRML MDRD: 71 ML/MIN/1.73SQ M
GLUCOSE SERPL-MCNC: 106 MG/DL (ref 65–140)
POTASSIUM SERPL-SCNC: 3.8 MMOL/L (ref 3.5–5.3)
SODIUM SERPL-SCNC: 137 MMOL/L (ref 136–145)

## 2018-06-14 PROCEDURE — G8987 SELF CARE CURRENT STATUS: HCPCS

## 2018-06-14 PROCEDURE — 97167 OT EVAL HIGH COMPLEX 60 MIN: CPT

## 2018-06-14 PROCEDURE — G8978 MOBILITY CURRENT STATUS: HCPCS

## 2018-06-14 PROCEDURE — G8979 MOBILITY GOAL STATUS: HCPCS

## 2018-06-14 PROCEDURE — G8988 SELF CARE GOAL STATUS: HCPCS

## 2018-06-14 PROCEDURE — 80048 BASIC METABOLIC PNL TOTAL CA: CPT | Performed by: NURSE PRACTITIONER

## 2018-06-14 PROCEDURE — 97162 PT EVAL MOD COMPLEX 30 MIN: CPT

## 2018-06-14 PROCEDURE — 99232 SBSQ HOSP IP/OBS MODERATE 35: CPT | Performed by: SURGERY

## 2018-06-14 RX ADMIN — LEVETIRACETAM 500 MG: 500 TABLET ORAL at 08:28

## 2018-06-14 RX ADMIN — Medication 1 TABLET: at 22:20

## 2018-06-14 RX ADMIN — CHLORHEXIDINE GLUCONATE 15 ML: 1.2 RINSE ORAL at 08:28

## 2018-06-14 RX ADMIN — ACETAMINOPHEN 650 MG: 325 TABLET, FILM COATED ORAL at 12:25

## 2018-06-14 RX ADMIN — CHLORHEXIDINE GLUCONATE 15 ML: 1.2 RINSE ORAL at 22:20

## 2018-06-14 RX ADMIN — LEVETIRACETAM 500 MG: 500 TABLET ORAL at 22:20

## 2018-06-14 NOTE — PROGRESS NOTES
Progress Note - Toney Lay 10/27/1930, 80 y o  male MRN: [id number]    Unit/Bed#: [facility] 932-01 Encounter: [id number]    Primary Care Provider: Kenia Crandall   Date and time admitted to hospital: 6/11/2018 10:27 PM        Acute kidney injury [facility])   Assessment & Plan    - resolved with gentle hydration, creatinine 0 96        Physical deconditioning   Assessment & Plan    -Determine placement PT/OT        Fall (on) (from) other stairs and steps, initial encounter   Assessment & Plan    - geriatrics recommendations appreciated  - PT/OT        Closed fracture of temporal bone [facility])   Assessment & Plan    -ENT saw patient needs outpatient audiogram  - no surgical management at this time        Closed nondisplaced fracture of fifth cervical vertebra (HCC)   Assessment & Plan    -Cervical collar at all times        SAH (subarachnoid hemorrhage) ([facility] [address] )   Assessment & Plan    - see above        * Subdural hemorrhage (HCC)   Assessment & Plan    -Stable on repeat CT   -Continue Keppra  -GCS drops repeat head CT  -PT/OT  -Currently is GCS 15, AAOx4  - neurosurgery signing off with plans for 2 week x-ray and CT scan                  Subjective/Objective   Chief Complaint: Would like to get out of hospital     Subjective: Patient seen and examined this morning, no acute events overnight  Pain is well controlled  No complaints at this time       Objective:     Meds/Allergies   Prescriptions Prior to Admission   Medication Sig Dispense Refill Last Dose    aspirin 81 MG tablet Take by mouth   6/11/2018 at 0800    BuPROPion HCl (WELLBUTRIN PO) Take by mouth   6/11/2018 at 0800    Furosemide (LASIX PO) Take by mouth   6/11/2018 at 0800    lisinopril (ZESTRIL) 40 mg tablet Take by mouth   6/11/2018 at 0800    metoprolol succinate (TOPROL XL) 100 mg 24 hr tablet Take by mouth   6/11/2018 at 0800    multivitamin (THERAGRAN) TABS Take 1 tablet by mouth daily   6/11/2018 at 0800       Vitals: Blood pressure 163/74, pulse 60, temperature 98 5 °F (36 9 °C), temperature source Oral, resp  rate 16, height 5' 10" (1 778 m), weight 97 2 kg (214 lb 4 6 oz), SpO2 94 %  Body mass index is 30 75 kg/m²  SpO2: SpO2: 94 %    ABG: No results found for: PHART, FAS2YAN, PO2ART, AXR2MBL, N6MDUTMK, BEART, SOURCE      Intake/Output Summary (Last 24 hours) at 06/14/18 0725  Last data filed at 06/14/18 5166   Gross per 24 hour   Intake              320 ml   Output              300 ml   Net               20 ml       Invasive Devices     Peripheral Intravenous Line            Peripheral IV 06/11/18 Left Antecubital 3 days    Peripheral IV 06/11/18 Left Forearm 2 days    Peripheral IV 06/11/18 Right Antecubital 2 days                Nutrition/GI Proph/Bowel Reg:  Regular diet    Physical Exam:   Gen: Alert and oriented to person, place, time  HEENT: EOMI, eyes clear, moist mucus membranes, hearing intact  Respiratory: Bilateral chest rise  No audible wheezing found  Cardiovascular: Regular Rate and Rhythm  Abdomen: soft nontender/nondistended  EXT: Motor and sensation intact bilateral upper and lower extremities   NEURO: No focal deficits, mentating well  Cervical collar in place   SKIN: Intact     Lab Results:   BMP/CMP:   Lab Results   Component Value Date     06/14/2018    K 3 8 06/14/2018     06/14/2018    CO2 26 06/14/2018    ANIONGAP 6 06/14/2018    BUN 17 06/14/2018    CREATININE 0 96 06/14/2018    GLUCOSE 106 06/14/2018    CALCIUM 8 2 (L) 06/14/2018    EGFR 71 06/14/2018     Imaging/EKG Studies: Results: I have personally reviewed pertinent reports     and I have personally reviewed pertinent films in PACS  Other Studies:   none  VTE Prophylaxis:  Bilateral SCDs,  Holding chemoprophylaxis at this time per neurosurgery

## 2018-06-14 NOTE — PHYSICAL THERAPY NOTE
Physical Therapy Evaluation     Patient's Name: Katia Lambert    Admitting Diagnosis  SAH (subarachnoid hemorrhage) ([place] [address] ) [I60 9]  SDH (subdural hematoma) ([place] [address] ) [I62 00]  Closed fracture of temporal bone, initial encounter ([place] [address] ) [S02 19XA]  Closed nondisplaced fracture of fifth cervical vertebra, unspecified fracture morphology, initial encounter ([place] [address] ) [S12 401A]  Unspecified multiple injuries, initial encounter [T07  XXXA]    Problem List  Patient Active Problem List   Diagnosis    Subdural hemorrhage ([place] [address] )    [facility]    Closed nondisplaced fracture of fifth cervical vertebra (HCC)    Closed fracture of temporal bone ([place] [address] )    Fall (on) (from) other stairs and steps, initial encounter    Physical deconditioning    Visual impairment    Acute kidney injury ([place] [address] )       Past Medical History  Past Medical History:   Diagnosis Date    Anxiety     CAD (coronary artery disease)     Cataract     left s/p removal    Hyperlipidemia     Macular degeneration     right       Past Surgical History  Past Surgical History:   Procedure Laterality Date    CATARACT EXTRACTION Left     CORONARY ANGIOPLASTY WITH STENT PLACEMENT      CORONARY ARTERY BYPASS GRAFT      TONSILECTOMY AND ADNOIDECTOMY        06/14/18 1257   Note Type   Note type Eval/Treat   Pain Assessment   Pain Assessment No/denies pain   Pain Score No Pain   Home Living   Type of Home Apartment  ([employer] [facility])   Home Layout Multi-level;Elevator  (2nd floor apartment )   Bathroom Shower/Tub Walk-in shower   Bathroom Toilet Standard   Bathroom Equipment Grab bars in shower;Built-in shower seat;Grab bars around toilet   Bathroom Accessibility Accessible   Additional Comments Pt denies any use of AD for ambualtion    Prior Function   Level of [place] Independent with ADLs and functional mobility   Lives With Alone   Receives Help From Family  (son live a few miles away )   ADL Assistance Independent   IADLs Independent Falls in the last 6 months 1 to 4   Vocational Retired   Comments Pt drives locally; son drives longer distances  Restrictions/Precautions   Weight Bearing Precautions Per Order No   Braces or Orthoses C/S Collar   Other Precautions Fall Risk;Telemetry;Multiple lines;Spinal precautions; Chair Alarm; Bed Alarm;Cognitive   General   Family/Caregiver Present No   Cognition   Overall Cognitive Status WFL   Arousal/Participation Cooperative   Orientation Level Oriented X4   Memory Within functional limits   Following Commands Follows all commands and directions without difficulty   Comments Pt is pleasant and cooperative  Requires repetition at times 2* [place]  Educated on  CS collar and spinal precautions; will continue to reinforce  Visual reminder palced on white board  RUE Assessment   RUE Assessment WFL   LUE Assessment   LUE Assessment WFL   RLE Assessment   RLE Assessment WFL  (difficulty noted with donning socks )   LLE Assessment   LLE Assessment WFL  (difficulty noted with donning socks )   Coordination   Movements are Fluid and Coordinated 0   Coordination and Movement Description bradykinetic    Sensation WFL   Light Touch   RLE Light Touch Grossly intact   LLE Light Touch Grossly intact   Proprioception   RLE Proprioception Grossly intact   LLE Proprioception Grossly Intact   Bed Mobility   Supine to Sit 4  Minimal assistance   Additional items Assist x 1;HOB elevated; Increased time required;Verbal cues   Additional Comments Pt admits to dizziness since fall    Transfers   Sit to Stand 4  Minimal assistance   Additional items Assist x 1; Increased time required;Verbal cues   Stand to Sit 4  Minimal assistance   Additional items Assist x 1; Increased time required;Verbal cues   Toilet transfer 3  Moderate assistance   Additional items Assist x 1; Increased time required;Verbal cues;Standard toilet  (with use of grab bar  )   Ambulation/Elevation   Gait pattern Improper Weight shift;Decreased foot clearance;Shuffling; Short stride; Step to   Gait Assistance 4  Minimal assist   Additional items Assist x 1   Assistive Device Rolling walker   Distance 7'x2   Balance   Static Sitting Good   Dynamic Sitting Fair -   Static Standing Fair -   Dynamic Standing Poor +   Ambulatory Poor +   Endurance Deficit   Endurance Deficit Yes   Endurance Deficit Description limited by dizziness- educated on pacing with activity and spending time in 1 position following positional changes    Activity Tolerance   Activity Tolerance Treatment limited secondary to medical complications (Comment)   Medical Staff Made Aware Chise, OT; Will, CM    Nurse Made Aware appropriate to see    Assessment   Prognosis Good   Problem List Decreased strength;Decreased range of motion;Decreased endurance; Impaired balance;Decreased mobility; Impaired hearing;Decreased skin integrity;Orthopedic restrictions;Pain   Assessment Pt is an 80year old male presenting gas a trauma transfer from [facility] following a fall getting off the bus at an [employer] game  Pt with a problem list which includes SAH  SDH, closed fracture of temporal bone, and closed fracture of 5th cervical vertebra  Pt with an additional problem list/PMH which includes visual impairment, EMILY, anxiety, CAD and macular degeneration  PT consulted to assess functional mobility and assist with safe d c planning  PT eval performed with CS collar and cervical spine precautions  PTA, pt living at in an (I) where he has elevator access and denies any additional falls  On eval, pt presenting with the following deficits: impaired balance, decreased strength and ROM, pain, poor tolerance to activity and decreased overall functional mobility  Pt requires min to mod Ax1 for functional tasks  Pt below baseline as he typically is (I) and not using an AD  Pt mobilizes with slow gait speed and reports dizziness with activity  PT to continue to follow pt during stay to progress functional mobility (I) and safety  Rehab recommended at this time  Barriers to Discharge Decreased caregiver support   Barriers to Discharge Comments (I)LF   Goals   Patient Goals Patient would like to to regain his (I)   STG Expiration Date 06/24/18   Short Term Goal #1 Pt will be mod(I) with rolling R and L for ease with OOB transfer  Pt will be mod(I) with sit<->stand to promote (I) with toileting  Pt will be mod(I) with supine<->sit transfers to promote OOB  Pt will be mod(I) with ambualtion of household distances (appx 50') to reduce caregiver burden  Pt will be S with community distance ambulaiton using least restrictive AD to increase tolerance to activity  Pt will be S ascending and descending FF stairs (not required for d c home) to increase strength and ROM  Pt will particiapte in HEP consisitng of balance, strength, ROM and coordinaiton tasks to increase activitiy, improve (I) and decrease pain  Pt will recall and demonstrate understanding of spinal precautions and CS collar 100% without verbal instruction  Treatment Day 0   Plan   Treatment/Interventions Functional transfer training;LE strengthening/ROM; Elevations; Therapeutic exercise;Patient/family training; Endurance training;Equipment eval/education; Bed mobility;Gait training;Spoke to nursing;Spoke to case management;OT   PT Frequency Other (Comment)  (4-6x/wk)   Recommendation   Recommendation Post acute IP rehab   Equipment Recommended Walker   PT - OK to Discharge Yes  (to rehab )   Additional Comments OOB in chair with alarm activated and all needs within reach    Barthel Index   Feeding 10   Bathing 0   Grooming Score 5   Dressing Score 5   Bladder Score 10   Bowels Score 10   Toilet Use Score 5   Transfers (Bed/Chair) Score 10   Mobility (Level Surface) Score 0   Stairs Score 0   Barthel Index Score 55             Ese Newman, PT

## 2018-06-14 NOTE — PLAN OF CARE
Problem: OCCUPATIONAL THERAPY ADULT  Goal: Performs self-care activities at highest level of function for planned discharge setting  See evaluation for individualized goals  Treatment Interventions: ADL retraining, Functional transfer training, Endurance training, Cognitive reorientation, Patient/family training, Equipment evaluation/education, Compensatory technique education, Energy conservation          See flowsheet documentation for full assessment, interventions and recommendations  Limitation: Decreased ADL status, Decreased Safe judgement during ADL, Decreased endurance, Decreased self-care trans, Decreased high-level ADLs, Decreased cognition  Prognosis: Good  Assessment: 86 YO MALE SEEN FOR INITIAL OT EVAL S/P FALL DOWN BUS STEPS WITH +HEADSTRIKE  DX INCLUDES SDH, SAH, C5 FX AND TEMPORAL BONE FX  PT WITH C-COLLAR +C-SPINE PRECAUTIONS  PROBLEMS LIST INCLUDES EMILY, CAD, HTN AND HLD  PT IS FROM [employer] ([facility] [place] WHERE HE REPORTS BEING OVERALL INDEPENDENT FOR ADLS/LT IADLS/LT DRIVING PTA  PT CURRENTLY REQUIRES OVERALL MIN-MOD A FOR ADLS/TRANSFERS AND MIN A FOR FUNCTIONAL MOBILITY WITH USE OF RW  PT REPORTS DIZZINESS WITH CHANGE OF POSITION WHICH IMPROVED WITH SHORT REST BREAK AND EDUCATION ON CUES FOR DEEP BREATHING  PT IS LIMITED 2' PAIN, FATIGUE, IMPAIRED BALANCE, C-COLLAR WITH C-SPINE PRECAUTIONS, LIMITED RECALL OF LEARNED PRECAUTIONS, FALL RISK, [place], AND LIMITED ACTIVITY TOLERANCE  FROM AN OT PERSPECTIVE, PT WOULD BENEFIT FROM CONT OT SERVICES IN AN INPT REHAB SETTING UPON D/C  PT AGREEABLE AND EAGER TO CONT THERAPY SERVICES  WILL CONT TO FOLLOW TO ADDRESS THE BELOW DESCRIBED GOALS        OT Discharge Recommendation: Short Term Rehab  OT - OK to Discharge: Yes ([address] [place] [address] Osteopathy )

## 2018-06-14 NOTE — PLAN OF CARE
Problem: PHYSICAL THERAPY ADULT  Goal: Performs mobility at highest level of function for planned discharge setting  See evaluation for individualized goals  Treatment/Interventions: Functional transfer training, LE strengthening/ROM, Elevations, Therapeutic exercise, Patient/family training, Endurance training, Equipment eval/education, Bed mobility, Gait training, Spoke to nursing, Spoke to case management, OT  Equipment Recommended: Mary Roger       See flowsheet documentation for full assessment, interventions and recommendations  Prognosis: Good  Problem List: Decreased strength, Decreased range of motion, Decreased endurance, Impaired balance, Decreased mobility, Impaired hearing, Decreased skin integrity, Orthopedic restrictions, Pain  Assessment: Pt is an 80year old male presenting gas a trauma transfer from [facility] following a fall getting off the bus at an [employer] game  Pt with a problem list which includes SAH  SDH, closed fracture of temporal bone, and closed fracture of 5th cervical vertebra  Pt with an additional problem list/PMH which includes visual impairment, EMILY, anxiety, CAD and macular degeneration  PT consulted to assess functional mobility and assist with safe d c planning  PT eval performed with CS collar and cervical spine precautions  PTA, pt living at in an (I) where he has elevator access and denies any additional falls  On eval, pt presenting with the following deficits: impaired balance, decreased strength and ROM, pain, poor tolerance to activity and decreased overall functional mobility  Pt requires min to mod Ax1 for functional tasks  Pt below baseline as he typically is (I) and not using an AD  Pt mobilizes with slow gait speed and reports dizziness with activity  PT to continue to follow pt during stay to progress functional mobility (I) and safety  Rehab recommended at this time    Barriers to Discharge: Decreased caregiver support  Barriers to Discharge Comments: (I)LF  Recommendation: Post acute IP rehab     PT - OK to Discharge: Yes (to rehab )    See flowsheet documentation for full assessment

## 2018-06-14 NOTE — ASSESSMENT & PLAN NOTE
-Stable on repeat CT   -Continue Keppra  -GCS drops repeat head CT  -PT/OT  -Currently is GCS 15, AAOx4  - neurosurgery signing off with plans for 2 week x-ray and CT scan

## 2018-06-14 NOTE — SOCIAL WORK
CM met with pt and his son to discuss d/c plan   They would like referrals to [facility], [place], [facility], [address] [place], and [place] in that order

## 2018-06-14 NOTE — OCCUPATIONAL THERAPY NOTE
[address] [place] Evaluation     Patient Name: Juancho Mccollum  Today's Date: 6/14/2018  Problem List  Patient Active Problem List   Diagnosis    [facility]dural hemorrhage ([facility] [address] )    [facility]    Closed nondisplaced fracture of fifth cervical vertebra (HCC)    Closed fracture of temporal bone ([facility] [address] )    Fall (on) (from) other stairs and steps, initial encounter    Physical deconditioning    Visual impairment    Acute kidney injury [facility])     Past Medical History  Past Medical History:   Diagnosis Date    Anxiety     CAD (coronary artery disease)     Cataract     left s/p removal    Hyperlipidemia     Macular degeneration     right     Past Surgical History  Past Surgical History:   Procedure Laterality Date    CATARACT EXTRACTION Left     CORONARY ANGIOPLASTY WITH STENT PLACEMENT      CORONARY ARTERY BYPASS GRAFT      TONSILECTOMY AND ADNOIDECTOMY           06/14/18 1300   Note Type   Note type Eval/Treat   Restrictions/Precautions   Weight Bearing Precautions Per Order No   Braces or Orthoses C/S Collar   Other Precautions Cognitive; Chair Alarm; Bed Alarm; Fall Risk;Pain;Hard of hearing   Pain Assessment   Pain Assessment 0-10   Pain Score 2   Pain Type Acute pain   Pain Location Head;Neck   Patient's Stated Pain Goal No pain   Hospital Pain Intervention(s) Repositioned; Ambulation/increased activity; Distraction; Emotional support   Response to Interventions TOLERATED    Home Living   Type of Home Apartment  ([employer] [facility] )   Home Layout Multi-level; Able to live on main level with bedroom/bathroom; Elevator   Bathroom Shower/Tub Walk-in shower   Bathroom Toilet Standard   Bathroom Equipment Grab bars in shower;Grab bars around toilet;Built-in shower seat   Bathroom Accessibility Accessible   Additional Comments PT REPORTS NO USE OF AD AT BASELINE   PT UTILIZED GRAB BARS IN BATHROOM    Prior Function   Level of [place] Independent with ADLs and functional mobility Lives With Alone   Receives Help From Family   ADL Assistance Independent   IADLs Needs assistance   Falls in the last 6 months 1 to 4   Vocational Retired   Lifestyle   Autonomy  [facility] ADL/LT IADLS  PT RELIES ON FACILITY TO ASSIST WITH HEAVY MEALS-DINNER ONLY  PT DRIVES SHORT DISTANCES ONLY  Reciprocal Relationships SUPPORTIVE SON WHO LIVES NEAR BY AND VISITS WEEKLY  SON ASSISTS WITH LONG DRIVES    Service to Others RETIRED; 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[address] [employer] [facility] PT ENJOYS PARTICIPATING IN THERAPY STATING "I HAVE A GREAT RESPECT FOR WHAT YOU DO"  Psychosocial   Psychosocial (WDL) WDL   ADL   Eating Assistance 5  Supervision/Setup   Grooming Assistance 4  Minimal Assistance   UB Bathing Assistance 4  Minimal Assistance   LB Bathing Assistance 3  Moderate Assistance   UB Dressing Assistance 4  Minimal [address] 3  Moderate [address]  4  Minimal Assistance   Functional Assistance 4  Minimal Assistance   Bed Mobility   Supine to Sit 4  Minimal assistance   Additional items Assist x 1; Increased time required;Verbal cues;LE management;HOB elevated   Sit to Supine Unable to assess  (PT LEFT OOB WITH ALARM ON )   Transfers   Sit to Stand 4  Minimal assistance   Additional items Assist x 1; Increased time required;Verbal cues   Stand to Sit 4  Minimal assistance   Additional items Assist x 1; Increased time required;Verbal cues   Toilet transfer 3  Moderate assistance   Additional items Assist x 1; Increased time required;Verbal cues;Standard toilet   Functional Mobility   Functional Mobility 4  Minimal assistance   Additional items Rolling walker   Balance   Static Sitting Good   Static Standing Fair -   Ambulatory Poor +   Activity Tolerance   Activity Tolerance Patient limited by fatigue;Treatment limited secondary to medical complications (Comment)   Medical Staff Made Aware SPOKE TO CM REGARDING D/C PLAN    Nurse Made Aware APPROPRIATE TO SEE    RUE Assessment   RUE Assessment WFL   LUE Assessment   LUE Assessment WFL   Hand Function   Gross Motor Coordination Functional   Fine Motor Coordination Functional   Sensation   Light Touch Partial deficits in the RUE;Partial deficits in the LUE  (DISTALLY; PT REPORTS THIS IS HIS BASELINE )   Vision-Basic Assessment   Current Vision Wears glasses all the time   Cognition   Overall Cognitive Status [facility]   Arousal/Participation Alert; Cooperative   Attention Within functional limits   Orientation Level Oriented to person   Memory Within functional limits   Following Commands Follows all commands and directions without difficulty   Comments PT IS PLEASANT AND COOPERATIVE  PT EDUCATED ON SPINAL PRECAUTIONS, WITH 2/3 RECALL  PT PROVIDED WITH VISUAL CUE FOR REMINDER AND WOULD BENEFIT FROM ADDITIONAL EDUCATION  PT IS [place], REQUIRING SIMPLE DIRECT CUES  ALARM ON FOR SAFETY    Assessment   Limitation Decreased ADL status; Decreased Safe judgement during ADL;Decreased endurance;Decreased self-care trans;Decreased high-level ADLs; Decreased cognition   Prognosis Good   Assessment 88 YO MALE SEEN FOR INITIAL OT EVAL S/P FALL DOWN BUS STEPS WITH +HEADSTRIKE  DX INCLUDES SDH, SAH, C5 FX AND TEMPORAL BONE FX  PT WITH C-COLLAR +C-SPINE PRECAUTIONS  PROBLEMS LIST INCLUDES EMILY, CAD, HTN AND HLD  PT IS FROM [place] [facility] WHERE HE REPORTS BEING OVERALL INDEPENDENT FOR ADLS/LT IADLS/LT DRIVING PTA  PT CURRENTLY REQUIRES OVERALL MIN-MOD A FOR ADLS/TRANSFERS AND MIN A FOR FUNCTIONAL MOBILITY WITH USE OF RW  PT REPORTS DIZZINESS WITH CHANGE OF POSITION WHICH IMPROVED WITH SHORT REST BREAK AND EDUCATION ON CUES FOR DEEP BREATHING  PT IS LIMITED 2' PAIN, FATIGUE, IMPAIRED BALANCE, C-COLLAR WITH C-SPINE PRECAUTIONS, LIMITED RECALL OF LEARNED PRECAUTIONS, FALL RISK, [place], AND LIMITED ACTIVITY TOLERANCE  FROM AN OT PERSPECTIVE, PT WOULD BENEFIT FROM CONT OT SERVICES IN AN INPT REHAB SETTING UPON D/C   PT AGREEABLE AND EAGER TO CONT THERAPY SERVICES  WILL CONT TO FOLLOW TO ADDRESS THE BELOW DESCRIBED GOALS  Goals   Patient Goals TO CONT TO PARTICIPATE IN THERAPY SERVICES    LTG Time Frame 10-14   Long Term Goal #1 SEE BELOW    Plan   Treatment Interventions ADL retraining;Functional transfer training; Endurance training;Cognitive reorientation;Patient/family training;Equipment evaluation/education; Compensatory technique education; Energy conservation   Goal Expiration Date 06/28/18   OT Frequency 3-5x/wk   Recommendation   OT Discharge Recommendation Short Term Rehab   OT - OK to Discharge Yes  (WHEN MEDICALLY CLEARED )   Barthel Index   Feeding 10   Bathing 0   Grooming Score 5   Dressing Score 5   Bladder Score 10   Bowels Score 10   Toilet Use Score 5   Transfers (Bed/Chair) Score 10   Mobility (Level Surface) Score 0   Stairs Score 0   Barthel Index Score 55   Modified Dovray Scale   Modified Dovray Scale 4       OCCUPATIONAL THERAPY GOALS TO BE MET WITHIN 10-14 DAYS:    -Pt will complete additional cognitive assessment with 100% attention to task in order to assist with safe d/c plan  -Pt will follow 100% simple 2-step commands and be A&O x4 consistently with environmental cues to increase participation in functional activities  -Pt will increase bed mobility to MOD I to participate in functional activities with G tolerance and balance  -Pt will improve functional mobility and transfers to MOD I on/off all surfaces w/ G balance/safety including toileting   -Pt will participate in lt grooming task with MOD I after set-up standing at sink ~3-5 minutes with G safety and balance     -Pt will increase independence in all ADLS, including donning/doffing C-Collar to MOD I with G balance sitting upright in chair   -Pt will improve activity tolerance to G for 30 min txment sessions w/ G carry over of learned energy conservation techniques   -Pt will improve independence in lt homemaking activities to MOD I without requiring cues for safety   -Pt will demonstrate G carryover of learned c-spine precautions, safety techniques and proper body mechanics in functional and leisure activities with use of DME        Documentation completed by ZENOBIA Proctor, OTR/L

## 2018-06-14 NOTE — PROGRESS NOTES
[facility]PR[facility] Progress Note:    Subjective: Pt  s/p fall with right otic capsule sparing temporal bone fracture  Doing well  No acute events overnight  Objective:   Vitals:    06/13/18 2300   BP:    Pulse:    Resp:    Temp:    SpO2: 94%       Gen: NAD, A/O x 3  Neuro: CN 2-12 intact except as below  Lungs: Breathing easily  No Stertor or Stridor  CV: RRR  Good distal perfusion  Neck: Soft and flat  Abd: Soft NTND  Wound: scalp wound healing well    Assessment/Plan:  s/p right otic capsule sparing temporal bone fracture - Doing well     1  Needs outpatient audiogram    2  Please call [phone] to schedule follow up in 1-2 weeks    Dispo: per primary team

## 2018-06-15 VITALS
DIASTOLIC BLOOD PRESSURE: 83 MMHG | SYSTOLIC BLOOD PRESSURE: 170 MMHG | HEIGHT: 70 IN | OXYGEN SATURATION: 98 % | WEIGHT: 214.51 LBS | TEMPERATURE: 97.7 F | HEART RATE: 60 BPM | BODY MASS INDEX: 30.71 KG/M2 | RESPIRATION RATE: 18 BRPM

## 2018-06-15 PROBLEM — I10 ESSENTIAL HYPERTENSION: Status: ACTIVE | Noted: 2018-06-15

## 2018-06-15 PROCEDURE — 97535 SELF CARE MNGMENT TRAINING: CPT | Performed by: STUDENT IN AN ORGANIZED HEALTH CARE EDUCATION/TRAINING PROGRAM

## 2018-06-15 RX ORDER — HYDRALAZINE HYDROCHLORIDE 20 MG/ML
5 INJECTION INTRAMUSCULAR; INTRAVENOUS ONCE
Status: DISCONTINUED | OUTPATIENT
Start: 2018-06-15 | End: 2018-06-15 | Stop reason: HOSPADM

## 2018-06-15 RX ORDER — ACETAMINOPHEN 325 MG/1
TABLET ORAL
Qty: 30 TABLET | Refills: 0
Start: 2018-06-15

## 2018-06-15 RX ORDER — LISINOPRIL 20 MG/1
40 TABLET ORAL DAILY
Status: DISCONTINUED | OUTPATIENT
Start: 2018-06-15 | End: 2018-06-15 | Stop reason: HOSPADM

## 2018-06-15 RX ORDER — LEVETIRACETAM 500 MG/1
500 TABLET ORAL EVERY 12 HOURS SCHEDULED
Refills: 0
Start: 2018-06-15 | End: 2018-06-18

## 2018-06-15 RX ORDER — METOPROLOL SUCCINATE 100 MG/1
100 TABLET, EXTENDED RELEASE ORAL DAILY
Status: DISCONTINUED | OUTPATIENT
Start: 2018-06-15 | End: 2018-06-15 | Stop reason: HOSPADM

## 2018-06-15 RX ADMIN — METOPROLOL SUCCINATE 100 MG: 100 TABLET, EXTENDED RELEASE ORAL at 10:29

## 2018-06-15 RX ADMIN — LISINOPRIL 40 MG: 20 TABLET ORAL at 10:29

## 2018-06-15 RX ADMIN — ACETAMINOPHEN 650 MG: 325 TABLET, FILM COATED ORAL at 08:23

## 2018-06-15 RX ADMIN — ACETAMINOPHEN 650 MG: 325 TABLET, FILM COATED ORAL at 16:14

## 2018-06-15 RX ADMIN — OXYCODONE HYDROCHLORIDE 2.5 MG: 5 TABLET ORAL at 01:42

## 2018-06-15 RX ADMIN — ACETAMINOPHEN 650 MG: 325 TABLET, FILM COATED ORAL at 00:18

## 2018-06-15 RX ADMIN — CHLORHEXIDINE GLUCONATE 15 ML: 1.2 RINSE ORAL at 08:23

## 2018-06-15 RX ADMIN — LEVETIRACETAM 500 MG: 500 TABLET ORAL at 08:23

## 2018-06-15 NOTE — ASSESSMENT & PLAN NOTE
-systolic blood pressure approaching 190 last night, required 1 dose of hydralazine  -C/O headache  -blood pressures now controlled  -Re-ordered home metoprolol and lisinopril   -continue to monitor

## 2018-06-15 NOTE — PLAN OF CARE
DISCHARGE PLANNING     Discharge to home or other facility with appropriate resources Adequate for Discharge        DISCHARGE PLANNING - CARE MANAGEMENT     Discharge to post-acute care or home with appropriate resources Adequate for Discharge        HEMATOLOGIC - ADULT     Maintains hematologic stability Adequate for Discharge        INFECTION - ADULT     Absence or prevention of progression during hospitalization Adequate for Discharge     Absence of fever/infection during neutropenic period Adequate for Discharge        Knowledge Deficit     Patient/family/caregiver demonstrates understanding of disease process, treatment plan, medications, and discharge instructions Adequate for Discharge        METABOLIC, FLUID AND ELECTROLYTES - ADULT     Electrolytes maintained within normal limits Adequate for Discharge     Fluid balance maintained Adequate for Discharge     Glucose maintained within target range Adequate for Discharge        NEUROSENSORY - ADULT     Achieves stable or improved neurological status Adequate for Discharge     Absence of seizures Adequate for Discharge     Remains free of injury related to seizures activity Adequate for Discharge     Achieves maximal functionality and self care Adequate for Discharge        Nutrition/Hydration-ADULT     Nutrient/Hydration intake appropriate for improving, restoring or maintaining nutritional needs Adequate for Discharge        PAIN - ADULT     Verbalizes/displays adequate comfort level or baseline comfort level Adequate for Discharge        Potential for Falls     Patient will remain free of falls Adequate for Discharge        Prexisting or High Potential for Compromised Skin Integrity     Skin integrity is maintained or improved Adequate for Discharge        SAFETY ADULT     Maintain or return to baseline ADL function Adequate for Discharge     Maintain or return mobility status to optimal level Adequate for Discharge        SKIN/TISSUE INTEGRITY - ADULT     Skin integrity remains intact Adequate for Discharge     Incision(s), wounds(s) or drain site(s) healing without S/S of infection Adequate for Discharge     Oral mucous membranes remain intact Adequate for Discharge

## 2018-06-15 NOTE — PROGRESS NOTES
06/15/18 1409   Clinical Encounter Type   Visited With Patient   Routine Visit Introduction   Crisis Visit Trauma   Patient Spiritual Encounters   Spiritual Encounter Notes listened empathetically

## 2018-06-15 NOTE — OCCUPATIONAL THERAPY NOTE
06/15/18 1135   Restrictions/Precautions   Weight Bearing Precautions Per Order No   Braces or Orthoses C/S Collar   Other Precautions Cognitive; Chair Alarm; Bed Alarm; Fall Risk;Pain;Spinal precautions;Hard of hearing   Pain Assessment   Pain Assessment 0-10   Pain Score 3   ADL   Where Assessed Supine, bed   UB Bathing Assistance 5  Supervision/Setup   UB Bathing Deficit Setup   LB Bathing Assistance 3  Moderate Assistance   LB Bathing Deficit Setup;Steadying;Supervision/safety; Buttocks;Right lower leg including foot; Left lower leg including foot   UB Dressing Assistance 4  Minimal Assistance   UB Dressing Deficit Setup; Thread RUE   LB Dressing Assistance 4  Minimal Assistance   LB Dressing Deficit Setup;Steadying; Requires assistive device for steadying;Supervision/safety; Increased time to complete;Use of adaptive equipment; Don/doff R sock; Thread RLE into pants   LB Dressing Comments don socks, underwear, and pants   Bed Mobility   Supine to Sit 5  Supervision   Sit to Supine Unable to assess   Transfers   Sit to Stand 4  Minimal assistance   Additional items Assist x 1   Stand to Sit 4  Minimal assistance   Additional items Assist x 1   Stand pivot 4  Minimal assistance   Additional items Assist x 1   Functional Mobility   Functional Mobility 4  Minimal assistance   Additional items Rolling walker   Cognition   Overall Cognitive Status WFL   Arousal/Participation Alert   Attention Within functional limits   Orientation Level Oriented X4   Memory Within functional limits   Following Commands Follows all commands and directions without difficulty   Activity Tolerance   Activity Tolerance Patient tolerated treatment well   Assessment   Assessment Pt participates in OT session with focus on bathing, dressing, transfers, and activity tolerance to increase I for d/c  Pt mod A bathing with SB while supine in bed with HOB elevated and requires A to wash buttocks and both feet 2* spinal precautions   Pt reviewed spinal precautions and remembered 3/3  Pt min A UB dressing to don gown and requires A to thread UE into sleeve  Pt min A LB dressing to don socks, pants, and underwear along with LHAE  Pt requires steadying A during stance with RW and pt has a LOB into chair 2* decreased LE strength and balance  Pt c/o lightheadedness t/o session and BP was 145/70 when checked  Pt CGA during transfers from EOB to bedside chair  Chair alarm turned on post session  Pt will continue to benefit from activity tolerance and adls  Plan   Treatment Interventions ADL retraining;Functional transfer training; Endurance training; Activityengagement   Goal Expiration Date 06/28/18   Treatment Day 1   OT Frequency 3-5x/wk   Recommendation   OT Discharge Recommendation Short Term Rehab

## 2018-06-15 NOTE — ASSESSMENT & PLAN NOTE
-Determine placement PT/OT  -geriatrics recommendations appreciated:  Supportive care  -discontinuing buspirone

## 2018-06-15 NOTE — PROGRESS NOTES
06/15/18 1409   Psychosocial   Patient Behaviors/Mood Cooperative   Plan of Care   Comments pt  expressed how happy he was with the service he is receiving here   Assessment Completed by: Unit visit

## 2018-06-15 NOTE — DISCHARGE SUMMARY
Discharge Summary - Maninder Blue 80 y o  male MRN: [id number]    [facility] Encounter: [id number]    Admission Date:   Admission Orders     Ordered        06/12/18 0045  Inpatient Admission  Once               Admitting Diagnosis: SAH (subarachnoid hemorrhage) ([place] [address] ) [I60 9]  SDH (subdural hematoma) ([place] [address] ) [I62 00]  Closed fracture of temporal bone, initial encounter ([place] [address] ) [S02 19XA]  Closed nondisplaced fracture of fifth cervical vertebra, unspecified fracture morphology, initial encounter ([place] [address] ) [S12 401A]  Unspecified multiple injuries, initial encounter [T07  XXXA]    HPI: Maninder Blue is a 80 y o  male who presents as a trauma transfer from [facility] following a fall earlier this evening  Patient states he was getting off the bus to go to an [employer] baseball game when he misstepped, and fell down multiple steps coming off the bus  Patient states he struck his head directly on the concrete  He did not lose consciousness and remembers the entire event  He does take aspirin 81 daily, but denies being on any other blood thinners  He states he mainly has a headache, but also has some neck pain  CT scan at outside hospital demonstrated subarachnoid and subdural hemorrhages, right temporal bone fracture, and C5 fracture  Currently patient is GCS 15 with no focal neurological deficits, but has delayed response/comprehension time  He does admit to some numbness/tingling in bilateral hands, which has been present the last few months and is unchanged  Procedures Performed: No orders of the defined types were placed in this encounter  Summary of Hospital Course:   Patient was maintained in a cervical collar throughout his hospital course  After being evaluated by the neurosurgery team, a course of non-operative management was pursued for the patient's injuries  On hospital night 3, patient had a bout of hypertension which resolved spontaneously   The rest of the patient's hospital course was uneventful and he was deemde stable for discharge to an inpatient facility on hospital day 4  All questions were answered to the patient's satisfaction  Significant Findings, Care, Treatment and Services Provided:   -Neurosurgical evaluation for his multifocal SAH, right SDH, temporal bone fracture and C5 superior facet fracture    Complications: None    Discharge Diagnosis:   -Multifocal SAH  -Small R holohemispheric SDH  -C5 superior facet fracture  -R temporal bone fracture    Resolved Problems  Date Reviewed: 6/13/2018          Resolved    [place] (hard of hearing) 6/13/2018     Resolved by  Guero Hawk, DO          Condition at Discharge: stable         Discharge instructions/Information to patient and family:   See after visit summary for information provided to patient and family  Provisions for Follow-Up Care:  See after visit summary for information related to follow-up care and any pertinent home health orders  PCP: Adrian Azul    Disposition: Skilled nursing facility at [facility]    Planned Readmission: No    Discharge Statement   I spent 30 minutes discharging the patient  This time was spent on the day of discharge  I had direct contact with the patient on the day of discharge  Additional documentation is required if more than 30 minutes were spent on discharge  Discharge Medications:  See after visit summary for reconciled discharge medications provided to patient and family

## 2018-06-15 NOTE — PROGRESS NOTES
Trauma paged regarding elevated bp  170/80  Pt's home blood pressure meds not ordered  Trauma aware and will order

## 2018-06-15 NOTE — PROGRESS NOTES
Progress Note - May Lucía 10/27/1930, 80 y o  male MRN: [id number]    [facility]: [id number]    Primary Care Provider: Josiah Ocampo   Date and time admitted to hospital: 6/11/2018 10:27 PM        Essential hypertension   Assessment & Plan    -systolic blood pressure approaching 190 last night, required 1 dose of hydralazine  -C/O headache  -blood pressures now controlled  -Re-ordered home metoprolol and lisinopril   -continue to monitor        Acute kidney injury [facility])   Assessment & Plan    - resolved with gentle hydration        Physical deconditioning   Assessment & Plan    -Determine placement PT/OT  -geriatrics recommendations appreciated:  Supportive care  -discontinuing buspirone        Fall (on) (from) other stairs and steps, initial encounter   Assessment & Plan    - geriatrics recommendations appreciated  - PT/OT        Closed fracture of temporal bone [facility])   Assessment & Plan    -ENT saw patient, needs outpatient audiogram  - no surgical management at this time        Closed nondisplaced fracture of fifth cervical vertebra (HCC)   Assessment & Plan    -Cervical collar at all times        SAH (subarachnoid hemorrhage) ([facility] [address] )   Assessment & Plan    - see above        * Subdural hemorrhage (HCC)   Assessment & Plan    -Stable on repeat CT   -Continue Keppra  -GCS drops repeat head CT  -PT/OT  -Currently is GCS 15, AAOx4  - neurosurgery signing off with plans for 2 week x-ray and CT scan                  Subjective/Objective   Chief Complaint: Headache     Subjective: Patient seen and examined this morning, had a bout of hypertension overnight which resolved spontaneously  Does currently complain of a headache, but BP is now well controlled  No other complaints at this time        Objective:     Meds/Allergies   Prescriptions Prior to Admission   Medication Sig Dispense Refill Last Dose    aspirin 81 MG tablet Take by mouth   6/11/2018 at 0800    BuPROPion HCl (WELLBUTRIN PO) Take by mouth   6/11/2018 at 0800    Furosemide (LASIX PO) Take by mouth   6/11/2018 at 0800    lisinopril (ZESTRIL) 40 mg tablet Take by mouth   6/11/2018 at 0800    metoprolol succinate (TOPROL XL) 100 mg 24 hr tablet Take by mouth   6/11/2018 at 0800    multivitamin (THERAGRAN) TABS Take 1 tablet by mouth daily   6/11/2018 at 0800       Vitals: Blood pressure 148/78, pulse 60, temperature 98 7 °F (37 1 °C), temperature source Oral, resp  rate 16, height 5' 10" (1 778 m), weight 97 3 kg (214 lb 8 1 oz), SpO2 92 %  Body mass index is 30 78 kg/m²  SpO2: SpO2: 92 %    ABG: No results found for: PHART, OKA5AZB, PO2ART, TSO6HSC, L1XYAIDK, BEART, SOURCE      Intake/Output Summary (Last 24 hours) at 06/15/18 8175  Last data filed at 06/15/18 0550   Gross per 24 hour   Intake              460 ml   Output             2275 ml   Net            -1815 ml       Invasive Devices     Peripheral Intravenous Line            Peripheral IV 06/11/18 Left Antecubital 4 days    Peripheral IV 06/11/18 Left Forearm 3 days    Peripheral IV 06/11/18 Right Antecubital 3 days                Nutrition/GI Proph/Bowel Reg:  Regular house diet    Physical Exam:   Gen: Alert and oriented to person, place, time  HEENT: EOMI, eyes clear, moist mucus membranes, hearing intact  Respiratory: Bilateral chest rise  No audible wheezing found  Cardiovascular: Regular Rate and Rhythm  Abdomen: soft nontender/nondistended  EXT: Motor and sensation intact bilateral upper and lower extremities   NEURO: No focal deficits, cervical collar in place  SKIN: Intact     Lab Results: Results: I have personally reviewed pertinent reports  Imaging/EKG Studies: Results: I have personally reviewed pertinent reports     and I have personally reviewed pertinent films in PACS  Other Studies:  None  VTE Prophylaxis:  Holding chemoprophylaxis at this time as per neurosurgery

## 2018-06-15 NOTE — PLAN OF CARE
Problem: OCCUPATIONAL THERAPY ADULT  Goal: Performs self-care activities at highest level of function for planned discharge setting  See evaluation for individualized goals  Treatment Interventions: ADL retraining, Functional transfer training, Endurance training, Cognitive reorientation, Patient/family training, Equipment evaluation/education, Compensatory technique education, Energy conservation          See flowsheet documentation for full assessment, interventions and recommendations  Outcome: Progressing  Limitation: Decreased ADL status, Decreased Safe judgement during ADL, Decreased endurance, Decreased self-care trans, Decreased high-level ADLs, Decreased cognition  Prognosis: Good  Assessment: Pt participates in OT session with focus on bathing, dressing, transfers, and activity tolerance to increase I for d/c  Pt mod A bathing with SB while supine in bed with HOB elevated and requires A to wash buttocks and both feet 2* spinal precautions  Pt reviewed spinal precautions and remembered 3/3  Pt min A UB dressing to don gown and requires A to thread UE into sleeve  Pt min A LB dressing to don socks, pants, and underwear along with LHAE  Pt requires steadying A during stance with RW and pt has a LOB into chair 2* decreased LE strength and balance  Pt c/o lightheadedness t/o session and BP was 145/70 when checked  Pt CGA during transfers from EOB to bedside chair  Chair alarm turned on post session  Pt will continue to benefit from activity tolerance and adls       OT Discharge Recommendation: Short Term Rehab  OT - OK to Discharge: Yes ([facility] )

## 2018-06-15 NOTE — SOCIAL WORK
Pt will d/c to [facility] today @1790 via [facility]  CM will inform pt's son

## 2018-06-18 ENCOUNTER — DOCUMENTATION (OUTPATIENT)
Dept: NEUROSURGERY | Facility: CLINIC | Age: 83
End: 2018-06-18

## 2018-06-18 NOTE — PROGRESS NOTES
06/18/2018-[phone] SPOKE TO KYRA AND SCHEDULED [facility] F/U WITH ED ON 06/28/2018 W/CT HEAD & C-SPINE XRAY  KYRA STATES SHE WILL HAVE CT AND C-SPINE XRAY COMPLETED AT [facility]     FAXED CT HEAD SCRIPT, C-SPINE XRAY SCRIPT, AND NPP TO FACILITY TO [facility]#[phone]

## 2021-04-29 NOTE — ASSESSMENT & PLAN NOTE
-Determine placement PT/OT Airway patent, Nasal mucosa clear. Mouth with normal mucosa. Throat has no vesicles, no oropharyngeal exudates and uvula is midline.